# Patient Record
Sex: MALE | URBAN - METROPOLITAN AREA
[De-identification: names, ages, dates, MRNs, and addresses within clinical notes are randomized per-mention and may not be internally consistent; named-entity substitution may affect disease eponyms.]

---

## 2022-09-22 ENCOUNTER — TRANSFERRED RECORDS (OUTPATIENT)
Dept: HEALTH INFORMATION MANAGEMENT | Facility: CLINIC | Age: 77
End: 2022-09-22

## 2022-12-14 ENCOUNTER — TRANSFERRED RECORDS (OUTPATIENT)
Dept: HEALTH INFORMATION MANAGEMENT | Facility: CLINIC | Age: 77
End: 2022-12-14

## 2022-12-24 ENCOUNTER — TRANSFERRED RECORDS (OUTPATIENT)
Dept: HEALTH INFORMATION MANAGEMENT | Facility: CLINIC | Age: 77
End: 2022-12-24

## 2022-12-24 ENCOUNTER — TRANSFERRED RECORDS (OUTPATIENT)
Dept: MULTI SPECIALTY CLINIC | Facility: CLINIC | Age: 77
End: 2022-12-24
Payer: MEDICARE

## 2022-12-24 LAB
ALT SERPL-CCNC: 102 U/L (ref 16–63)
AST SERPL-CCNC: 71 U/L (ref 15–37)
CREATININE (EXTERNAL): 1.3 MG/DL (ref 0.7–1.3)
GFR ESTIMATED (EXTERNAL): 54 ML/MIN/1.73M2
GLUCOSE (EXTERNAL): 114 MG/DL (ref 70–100)
POTASSIUM (EXTERNAL): 3.3 MMOL/L (ref 3.5–5.1)

## 2022-12-25 ENCOUNTER — APPOINTMENT (OUTPATIENT)
Dept: GENERAL RADIOLOGY | Facility: CLINIC | Age: 77
DRG: 439 | End: 2022-12-25
Attending: STUDENT IN AN ORGANIZED HEALTH CARE EDUCATION/TRAINING PROGRAM
Payer: MEDICARE

## 2022-12-25 ENCOUNTER — APPOINTMENT (OUTPATIENT)
Dept: ULTRASOUND IMAGING | Facility: CLINIC | Age: 77
DRG: 439 | End: 2022-12-25
Attending: INTERNAL MEDICINE
Payer: MEDICARE

## 2022-12-25 ENCOUNTER — HOSPITAL ENCOUNTER (INPATIENT)
Facility: CLINIC | Age: 77
LOS: 3 days | Discharge: HOME | DRG: 439 | End: 2022-12-28
Attending: INTERNAL MEDICINE | Admitting: INTERNAL MEDICINE
Payer: MEDICARE

## 2022-12-25 PROBLEM — K85.90 PANCREATITIS: Status: ACTIVE | Noted: 2022-12-25

## 2022-12-25 LAB
ALBUMIN SERPL BCG-MCNC: 3.4 G/DL (ref 3.5–5.2)
ALP SERPL-CCNC: 117 U/L (ref 40–129)
ALT SERPL W P-5'-P-CCNC: 65 U/L (ref 10–50)
ANION GAP SERPL CALCULATED.3IONS-SCNC: 11 MMOL/L (ref 7–15)
AST SERPL W P-5'-P-CCNC: 49 U/L (ref 10–50)
BASOPHILS # BLD AUTO: 0 10E3/UL (ref 0–0.2)
BASOPHILS NFR BLD AUTO: 0 %
BILIRUB DIRECT SERPL-MCNC: 0.34 MG/DL (ref 0–0.3)
BILIRUB SERPL-MCNC: 1.3 MG/DL
BUN SERPL-MCNC: 23.9 MG/DL (ref 8–23)
CALCIUM SERPL-MCNC: 9.3 MG/DL (ref 8.8–10.2)
CHLORIDE SERPL-SCNC: 108 MMOL/L (ref 98–107)
CREAT SERPL-MCNC: 1.21 MG/DL (ref 0.67–1.17)
CRP SERPL-MCNC: 115 MG/L
DEPRECATED HCO3 PLAS-SCNC: 23 MMOL/L (ref 22–29)
EOSINOPHIL # BLD AUTO: 0.1 10E3/UL (ref 0–0.7)
EOSINOPHIL NFR BLD AUTO: 0 %
ERYTHROCYTE [DISTWIDTH] IN BLOOD BY AUTOMATED COUNT: 13.1 % (ref 10–15)
GFR SERPL CREATININE-BSD FRML MDRD: 62 ML/MIN/1.73M2
GLUCOSE BLDC GLUCOMTR-MCNC: 103 MG/DL (ref 70–99)
GLUCOSE SERPL-MCNC: 93 MG/DL (ref 70–99)
HCT VFR BLD AUTO: 40.4 % (ref 40–53)
HGB BLD-MCNC: 13.6 G/DL (ref 13.3–17.7)
IMM GRANULOCYTES # BLD: 0.1 10E3/UL
IMM GRANULOCYTES NFR BLD: 1 %
INR PPP: 1.39 (ref 0.85–1.15)
LYMPHOCYTES # BLD AUTO: 1.5 10E3/UL (ref 0.8–5.3)
LYMPHOCYTES NFR BLD AUTO: 11 %
MAGNESIUM SERPL-MCNC: 1.8 MG/DL (ref 1.7–2.3)
MCH RBC QN AUTO: 31.3 PG (ref 26.5–33)
MCHC RBC AUTO-ENTMCNC: 33.7 G/DL (ref 31.5–36.5)
MCV RBC AUTO: 93 FL (ref 78–100)
MONOCYTES # BLD AUTO: 0.8 10E3/UL (ref 0–1.3)
MONOCYTES NFR BLD AUTO: 6 %
NEUTROPHILS # BLD AUTO: 11.5 10E3/UL (ref 1.6–8.3)
NEUTROPHILS NFR BLD AUTO: 82 %
NRBC # BLD AUTO: 0 10E3/UL
NRBC BLD AUTO-RTO: 0 /100
PLATELET # BLD AUTO: 171 10E3/UL (ref 150–450)
POTASSIUM SERPL-SCNC: 3.4 MMOL/L (ref 3.4–5.3)
PROT SERPL-MCNC: 5.9 G/DL (ref 6.4–8.3)
RBC # BLD AUTO: 4.35 10E6/UL (ref 4.4–5.9)
SODIUM SERPL-SCNC: 142 MMOL/L (ref 136–145)
TRIGL SERPL-MCNC: 39 MG/DL
WBC # BLD AUTO: 14 10E3/UL (ref 4–11)

## 2022-12-25 PROCEDURE — 82248 BILIRUBIN DIRECT: CPT | Performed by: STUDENT IN AN ORGANIZED HEALTH CARE EDUCATION/TRAINING PROGRAM

## 2022-12-25 PROCEDURE — 76705 ECHO EXAM OF ABDOMEN: CPT | Mod: 26 | Performed by: STUDENT IN AN ORGANIZED HEALTH CARE EDUCATION/TRAINING PROGRAM

## 2022-12-25 PROCEDURE — 93005 ELECTROCARDIOGRAM TRACING: CPT

## 2022-12-25 PROCEDURE — 80053 COMPREHEN METABOLIC PANEL: CPT | Performed by: STUDENT IN AN ORGANIZED HEALTH CARE EDUCATION/TRAINING PROGRAM

## 2022-12-25 PROCEDURE — 71046 X-RAY EXAM CHEST 2 VIEWS: CPT | Mod: 26 | Performed by: STUDENT IN AN ORGANIZED HEALTH CARE EDUCATION/TRAINING PROGRAM

## 2022-12-25 PROCEDURE — 250N000013 HC RX MED GY IP 250 OP 250 PS 637: Performed by: STUDENT IN AN ORGANIZED HEALTH CARE EDUCATION/TRAINING PROGRAM

## 2022-12-25 PROCEDURE — 120N000002 HC R&B MED SURG/OB UMMC

## 2022-12-25 PROCEDURE — 258N000003 HC RX IP 258 OP 636: Performed by: STUDENT IN AN ORGANIZED HEALTH CARE EDUCATION/TRAINING PROGRAM

## 2022-12-25 PROCEDURE — 36415 COLL VENOUS BLD VENIPUNCTURE: CPT | Performed by: STUDENT IN AN ORGANIZED HEALTH CARE EDUCATION/TRAINING PROGRAM

## 2022-12-25 PROCEDURE — 250N000011 HC RX IP 250 OP 636: Performed by: STUDENT IN AN ORGANIZED HEALTH CARE EDUCATION/TRAINING PROGRAM

## 2022-12-25 PROCEDURE — 76705 ECHO EXAM OF ABDOMEN: CPT

## 2022-12-25 PROCEDURE — 87040 BLOOD CULTURE FOR BACTERIA: CPT | Performed by: STUDENT IN AN ORGANIZED HEALTH CARE EDUCATION/TRAINING PROGRAM

## 2022-12-25 PROCEDURE — 93010 ELECTROCARDIOGRAM REPORT: CPT | Performed by: INTERNAL MEDICINE

## 2022-12-25 PROCEDURE — 86140 C-REACTIVE PROTEIN: CPT | Performed by: STUDENT IN AN ORGANIZED HEALTH CARE EDUCATION/TRAINING PROGRAM

## 2022-12-25 PROCEDURE — 71046 X-RAY EXAM CHEST 2 VIEWS: CPT

## 2022-12-25 PROCEDURE — 84478 ASSAY OF TRIGLYCERIDES: CPT | Performed by: STUDENT IN AN ORGANIZED HEALTH CARE EDUCATION/TRAINING PROGRAM

## 2022-12-25 PROCEDURE — 83735 ASSAY OF MAGNESIUM: CPT | Performed by: STUDENT IN AN ORGANIZED HEALTH CARE EDUCATION/TRAINING PROGRAM

## 2022-12-25 PROCEDURE — 85025 COMPLETE CBC W/AUTO DIFF WBC: CPT | Performed by: STUDENT IN AN ORGANIZED HEALTH CARE EDUCATION/TRAINING PROGRAM

## 2022-12-25 PROCEDURE — 999N000285 HC STATISTIC VASC ACCESS LAB DRAW WITH PIV START

## 2022-12-25 PROCEDURE — 85610 PROTHROMBIN TIME: CPT | Performed by: STUDENT IN AN ORGANIZED HEALTH CARE EDUCATION/TRAINING PROGRAM

## 2022-12-25 PROCEDURE — 999N000128 HC STATISTIC PERIPHERAL IV START W/O US GUIDANCE

## 2022-12-25 RX ORDER — MONTELUKAST SODIUM 10 MG/1
10 TABLET ORAL AT BEDTIME
Status: DISCONTINUED | OUTPATIENT
Start: 2022-12-25 | End: 2022-12-28 | Stop reason: HOSPADM

## 2022-12-25 RX ORDER — METRONIDAZOLE 500 MG/100ML
500 INJECTION, SOLUTION INTRAVENOUS EVERY 8 HOURS
Status: DISCONTINUED | OUTPATIENT
Start: 2022-12-25 | End: 2022-12-26

## 2022-12-25 RX ORDER — ACETAMINOPHEN 325 MG/1
650 TABLET ORAL EVERY 6 HOURS PRN
Status: DISCONTINUED | OUTPATIENT
Start: 2022-12-25 | End: 2022-12-28 | Stop reason: HOSPADM

## 2022-12-25 RX ORDER — MONTELUKAST SODIUM 10 MG/1
10 TABLET ORAL AT BEDTIME
COMMUNITY
Start: 2022-08-25

## 2022-12-25 RX ORDER — LIDOCAINE 40 MG/G
CREAM TOPICAL
Status: DISCONTINUED | OUTPATIENT
Start: 2022-12-25 | End: 2022-12-28 | Stop reason: HOSPADM

## 2022-12-25 RX ORDER — HYDROCHLOROTHIAZIDE 12.5 MG/1
12.5 TABLET ORAL DAILY
Status: DISCONTINUED | OUTPATIENT
Start: 2022-12-25 | End: 2022-12-28 | Stop reason: HOSPADM

## 2022-12-25 RX ORDER — NALOXONE HYDROCHLORIDE 0.4 MG/ML
0.4 INJECTION, SOLUTION INTRAMUSCULAR; INTRAVENOUS; SUBCUTANEOUS
Status: DISCONTINUED | OUTPATIENT
Start: 2022-12-25 | End: 2022-12-28 | Stop reason: HOSPADM

## 2022-12-25 RX ORDER — HYDROCHLOROTHIAZIDE 12.5 MG/1
12.5 TABLET ORAL DAILY
COMMUNITY
Start: 2022-08-25

## 2022-12-25 RX ORDER — OXYCODONE HYDROCHLORIDE 5 MG/1
5 TABLET ORAL EVERY 4 HOURS PRN
Status: DISCONTINUED | OUTPATIENT
Start: 2022-12-25 | End: 2022-12-28 | Stop reason: HOSPADM

## 2022-12-25 RX ORDER — ONDANSETRON 4 MG/1
4 TABLET, ORALLY DISINTEGRATING ORAL EVERY 6 HOURS PRN
Status: DISCONTINUED | OUTPATIENT
Start: 2022-12-25 | End: 2022-12-28 | Stop reason: HOSPADM

## 2022-12-25 RX ORDER — SENNOSIDES 8.6 MG
8.6 TABLET ORAL DAILY
Status: DISCONTINUED | OUTPATIENT
Start: 2022-12-25 | End: 2022-12-28 | Stop reason: HOSPADM

## 2022-12-25 RX ORDER — AMLODIPINE BESYLATE 10 MG/1
10 TABLET ORAL DAILY
Status: DISCONTINUED | OUTPATIENT
Start: 2022-12-25 | End: 2022-12-28 | Stop reason: HOSPADM

## 2022-12-25 RX ORDER — LISINOPRIL 10 MG/1
10 TABLET ORAL DAILY
Status: DISCONTINUED | OUTPATIENT
Start: 2022-12-25 | End: 2022-12-28 | Stop reason: HOSPADM

## 2022-12-25 RX ORDER — NALOXONE HYDROCHLORIDE 0.4 MG/ML
0.2 INJECTION, SOLUTION INTRAMUSCULAR; INTRAVENOUS; SUBCUTANEOUS
Status: DISCONTINUED | OUTPATIENT
Start: 2022-12-25 | End: 2022-12-28 | Stop reason: HOSPADM

## 2022-12-25 RX ORDER — HYDROMORPHONE HYDROCHLORIDE 1 MG/ML
0.5 INJECTION, SOLUTION INTRAMUSCULAR; INTRAVENOUS; SUBCUTANEOUS
Status: DISCONTINUED | OUTPATIENT
Start: 2022-12-25 | End: 2022-12-27

## 2022-12-25 RX ORDER — BENAZEPRIL HYDROCHLORIDE 10 MG/1
10 TABLET ORAL DAILY
COMMUNITY
Start: 2022-08-25

## 2022-12-25 RX ORDER — ATORVASTATIN CALCIUM 40 MG/1
40 TABLET, FILM COATED ORAL EVERY EVENING
Status: DISCONTINUED | OUTPATIENT
Start: 2022-12-25 | End: 2022-12-28 | Stop reason: HOSPADM

## 2022-12-25 RX ORDER — AMLODIPINE BESYLATE 10 MG/1
10 TABLET ORAL DAILY
COMMUNITY
Start: 2022-08-25

## 2022-12-25 RX ORDER — SODIUM CHLORIDE, SODIUM LACTATE, POTASSIUM CHLORIDE, CALCIUM CHLORIDE 600; 310; 30; 20 MG/100ML; MG/100ML; MG/100ML; MG/100ML
INJECTION, SOLUTION INTRAVENOUS CONTINUOUS
Status: DISCONTINUED | OUTPATIENT
Start: 2022-12-25 | End: 2022-12-25

## 2022-12-25 RX ORDER — CEFTRIAXONE 2 G/1
2 INJECTION, POWDER, FOR SOLUTION INTRAMUSCULAR; INTRAVENOUS EVERY 24 HOURS
Status: DISCONTINUED | OUTPATIENT
Start: 2022-12-25 | End: 2022-12-26

## 2022-12-25 RX ORDER — POLYETHYLENE GLYCOL 3350 17 G/17G
17 POWDER, FOR SOLUTION ORAL DAILY PRN
Status: DISCONTINUED | OUTPATIENT
Start: 2022-12-25 | End: 2022-12-28 | Stop reason: HOSPADM

## 2022-12-25 RX ORDER — PIPERACILLIN SODIUM, TAZOBACTAM SODIUM 3; .375 G/15ML; G/15ML
3.38 INJECTION, POWDER, LYOPHILIZED, FOR SOLUTION INTRAVENOUS EVERY 6 HOURS
Status: DISCONTINUED | OUTPATIENT
Start: 2022-12-25 | End: 2022-12-25

## 2022-12-25 RX ORDER — LANOLIN ALCOHOL/MO/W.PET/CERES
2000 CREAM (GRAM) TOPICAL DAILY
Status: DISCONTINUED | OUTPATIENT
Start: 2022-12-25 | End: 2022-12-28 | Stop reason: HOSPADM

## 2022-12-25 RX ORDER — SENNOSIDES 8.6 MG
8.6 TABLET ORAL 2 TIMES DAILY PRN
Status: DISCONTINUED | OUTPATIENT
Start: 2022-12-25 | End: 2022-12-25

## 2022-12-25 RX ORDER — OLOPATADINE HYDROCHLORIDE 665 UG/1
2 SPRAY NASAL 3 TIMES DAILY PRN
COMMUNITY
Start: 2022-10-31 | End: 2023-10-31

## 2022-12-25 RX ORDER — ATORVASTATIN CALCIUM 40 MG/1
40 TABLET, FILM COATED ORAL AT BEDTIME
COMMUNITY
Start: 2022-09-23 | End: 2023-09-18

## 2022-12-25 RX ORDER — SODIUM CHLORIDE 9 MG/ML
INJECTION, SOLUTION INTRAVENOUS CONTINUOUS
Status: DISCONTINUED | OUTPATIENT
Start: 2022-12-25 | End: 2022-12-25

## 2022-12-25 RX ORDER — POLYETHYLENE GLYCOL 3350 17 G/17G
17 POWDER, FOR SOLUTION ORAL DAILY
Status: DISCONTINUED | OUTPATIENT
Start: 2022-12-25 | End: 2022-12-25

## 2022-12-25 RX ORDER — POTASSIUM CHLORIDE 750 MG/1
40 TABLET, EXTENDED RELEASE ORAL ONCE
Status: COMPLETED | OUTPATIENT
Start: 2022-12-25 | End: 2022-12-25

## 2022-12-25 RX ORDER — SODIUM CHLORIDE, SODIUM LACTATE, POTASSIUM CHLORIDE, CALCIUM CHLORIDE 600; 310; 30; 20 MG/100ML; MG/100ML; MG/100ML; MG/100ML
INJECTION, SOLUTION INTRAVENOUS CONTINUOUS
Status: DISCONTINUED | OUTPATIENT
Start: 2022-12-25 | End: 2022-12-27

## 2022-12-25 RX ADMIN — ACETAMINOPHEN 650 MG: 325 TABLET, FILM COATED ORAL at 09:19

## 2022-12-25 RX ADMIN — CEFTRIAXONE SODIUM 2 G: 2 INJECTION, POWDER, FOR SOLUTION INTRAMUSCULAR; INTRAVENOUS at 12:27

## 2022-12-25 RX ADMIN — SODIUM CHLORIDE, POTASSIUM CHLORIDE, SODIUM LACTATE AND CALCIUM CHLORIDE: 600; 310; 30; 20 INJECTION, SOLUTION INTRAVENOUS at 21:55

## 2022-12-25 RX ADMIN — POTASSIUM CHLORIDE 40 MEQ: 750 TABLET, EXTENDED RELEASE ORAL at 09:43

## 2022-12-25 RX ADMIN — SODIUM CHLORIDE, POTASSIUM CHLORIDE, SODIUM LACTATE AND CALCIUM CHLORIDE: 600; 310; 30; 20 INJECTION, SOLUTION INTRAVENOUS at 09:45

## 2022-12-25 RX ADMIN — HYDROCHLOROTHIAZIDE 12.5 MG: 12.5 TABLET ORAL at 07:52

## 2022-12-25 RX ADMIN — SODIUM CHLORIDE, POTASSIUM CHLORIDE, SODIUM LACTATE AND CALCIUM CHLORIDE 1000 ML: 600; 310; 30; 20 INJECTION, SOLUTION INTRAVENOUS at 10:45

## 2022-12-25 RX ADMIN — SODIUM CHLORIDE: 9 INJECTION, SOLUTION INTRAVENOUS at 05:18

## 2022-12-25 RX ADMIN — PIPERACILLIN AND TAZOBACTAM 3.38 G: 3; .375 INJECTION, POWDER, LYOPHILIZED, FOR SOLUTION INTRAVENOUS at 05:18

## 2022-12-25 RX ADMIN — SODIUM CHLORIDE, POTASSIUM CHLORIDE, SODIUM LACTATE AND CALCIUM CHLORIDE: 600; 310; 30; 20 INJECTION, SOLUTION INTRAVENOUS at 14:18

## 2022-12-25 RX ADMIN — METRONIDAZOLE 500 MG: 500 INJECTION, SOLUTION INTRAVENOUS at 10:51

## 2022-12-25 RX ADMIN — MONTELUKAST 10 MG: 10 TABLET, FILM COATED ORAL at 21:44

## 2022-12-25 RX ADMIN — OXYCODONE HYDROCHLORIDE 5 MG: 5 TABLET ORAL at 09:19

## 2022-12-25 RX ADMIN — Medication 2000 MCG: at 07:53

## 2022-12-25 RX ADMIN — LISINOPRIL 10 MG: 10 TABLET ORAL at 07:53

## 2022-12-25 RX ADMIN — AMLODIPINE BESYLATE 10 MG: 10 TABLET ORAL at 07:52

## 2022-12-25 RX ADMIN — ATORVASTATIN CALCIUM 40 MG: 40 TABLET, FILM COATED ORAL at 21:44

## 2022-12-25 RX ADMIN — ACETAMINOPHEN 650 MG: 325 TABLET, FILM COATED ORAL at 21:44

## 2022-12-25 RX ADMIN — PIPERACILLIN AND TAZOBACTAM 3.38 G: 3; .375 INJECTION, POWDER, LYOPHILIZED, FOR SOLUTION INTRAVENOUS at 09:43

## 2022-12-25 RX ADMIN — METRONIDAZOLE 500 MG: 500 INJECTION, SOLUTION INTRAVENOUS at 19:30

## 2022-12-25 RX ADMIN — SENNOSIDES 8.6 MG: 8.6 TABLET, FILM COATED ORAL at 09:43

## 2022-12-25 ASSESSMENT — ACTIVITIES OF DAILY LIVING (ADL)
ADLS_ACUITY_SCORE: 20
ADLS_ACUITY_SCORE: 20
ADLS_ACUITY_SCORE: 33
ADLS_ACUITY_SCORE: 20

## 2022-12-25 NOTE — PLAN OF CARE
[id number]: Hypertensive. Parameters to notify not met. OVSS on RA. Denies nausea and SOB. PRN Oxy and tylenol given for 4/10 abdominal pain and effective. Pt denies pain this afternoon. Abd US and 2 view XR completed this shift. Had a BM this shift. 1L LR bolus given over 4 hours. Continues with IVMF @125 mL/Hr. K 3.4 and replaced orally. Recheck with AM labs. Zosyn discontinued. Started Rocephin and Flagyl. Diet advanced to clear liquid. Had some chicken broth and 1 cup of lemon ice for lunch. Had a small amount of emesis afterwards. Wife at bedside.

## 2022-12-25 NOTE — CONSULTS
"    GASTROENTEROLOGY CONSULTATION      Date of Admission:  12/25/2022          Chief Complaint:   We were asked by Heri Talbot MD of  to evaluate this patient with \"Pancreatitis\"          ASSESSMENT AND RECOMMENDATIONS:   Assessment:  Tay Montanez is a 77 year old male with a history of HTN, HLD, Asthma who was admitted 12/24/22 for acute pancreatitis.    # Acute interstitial pancreatitis     Criteria for diagnosis: abdominal pain suggestive of AP, lipase > 3 ULN, CT abdomen    Etiology:  Likely gallstones given dynamic changes of LFTs. TG, Calcium are normal.     Index CT abdomen: 12/24/22     BISAP on admission: 3    Organ Failure: JOCELYN    Fluid collection: None     Intervention: None    Thrombosis: None    Nutrition: NPO     Recommendations  - Okay for clear liquid diet and then advance diet slowly as tolerated to low fat and low residue diet.    - IV fluids: Recommend LR at at 5-10cc/kg/hr or 250 cc/hr with a goal of getting in 6 L of fluid over the first 24 hours. This will reduce the chances of developing pancreatic necrosis or other complications of pancreatitis. Aim for HR <120, UOP: 0.5-1 ml/kg/hr, BUN < 25 (or - Hematocrit reduction by at least 35 %, BUN decrease by about 35%)  - Pain control with opioids as needed per primary team. Avoid constipation with scheduled bowel regimen.   - Closely monitor electrolytes and replete PRN.   - Check triglyceride level.   - Check a RUQ US to rule out cholelithiasis.  - Consult surgery for cholecystectomy as inpatient.   - Encourage PO intake in the next 24 - 48 hours, nutritional support should be provided if remains NPO > 7 days, Nasojejunal feeds are preferred over TPN.  - Pain control, anti-emetics per primary team.     Gastroenterology follow up recommendations: Pending clinical course       Patient care plan discussed with Merrill Mederos MD, GI staff physician. Thank you for involving us in this patient's care. Please do not hesitate to contact " the GI service with any questions or concerns.     Ricky Perez M.D  GI fellow  [id number]  Department of Gastroenterology   -------------------------------------------------------------------------------------------------------------------   History is obtained from the patient and the medical record.          History of Present Illness:   Tay Montanez is a 77 year old male with a history of HTN, HLD, Asthma who was admitted 12/24/22 for acute pancreatitis as a transfer from [facility].   Per chart review, the patient presented to the ED in of upper abdominal discomfort several hours prior to his arrival to the emergency department. This was accompanied by at least 6 episodes of emesis. He did attempt to take some ibuprofen but vomited shortly after taking it. The patient's wife does relate a subjective fever. He did have concerns for some chills.The patient was unable to control his symptoms and subsequently summoned EMS for transfer to the emergency department. The patient's white count is found to be elevated at 15.1. His hemoglobin is found to be 15.4 with a platelet count of 260,000. His metabolic panel demonstrates a mildly elevated glucose at 187 with a BUN of 32 and a creatinine of 1.6 which is elevated from his baseline of 1.1.  The patient's alkaline phosphatase is elevated at 150 with an elevated AST at 193 and ALT at 137. His lipase is dramatically elevated at 9980. His lactate is elevated at 2.7. His troponin is within normal limits at 0.01. He is negative for influenza and COVID. His urinalysis demonstrates no evidence to suggest an infection.  CT scan of his abdomen and pelvis with IV contrast showed concerns for acute interstitial edematous pancreatitis. He does have scattered calcifications about his pancreas concerning for some underlying chronic pancreatitis as well. The patient received a bolus of LR and was transferred to the [facility] for further management. Prior to transfer he had a fever of  101 and BC were collected. CT head was unremarkable and Zosyn was initiated. 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   The patient denies alcohol use and is a never smoker. No prior pancreatitis. No FH of pancreatitis.             Past Medical History:   Reviewed and edited as appropriate  No past medical history on file.         Past Surgical History:   Reviewed and edited as appropriate   No past surgical history on file.         Previous Endoscopy:   No results found for this or any previous visit.         Social History:   Reviewed and edited as appropriate  Social History     Socioeconomic History     Marital status: Not on file     Spouse name: Not on file     Number of children: Not on file     Years of education: Not on file     Highest education level: Not on file   Occupational History     Not on file   Tobacco Use     Smoking status: Not on file     Smokeless tobacco: Not on file   Substance and Sexual Activity     Alcohol use: Not on file     Drug use: Not on file     Sexual activity: Not on file   Other Topics Concern     Not on file   Social History Narrative     Not on file     Social Determinants of Health     Financial Resource Strain: Not on file   Food Insecurity: Not on file   Transportation Needs: Not on file   Physical Activity: Not on file   Stress: Not on file   Social Connections: Not on file   Intimate Partner Violence: Not on file   Housing Stability: Not on file            Family History:   Reviewed and edited as appropriate  No family history on file.        Allergies:   Reviewed and edited as appropriate   Not on File         Medications:     Medications Prior to Admission   Medication Sig Dispense Refill Last Dose     amLODIPine (NORVASC) 10 MG tablet Take 1 tablet by mouth daily        aspirin (ASA) 81 MG EC tablet Take 1 tablet by mouth daily         "atorvastatin (LIPITOR) 40 MG tablet Take 40 mg by mouth        benazepril (LOTENSIN) 10 MG tablet Take 1 tablet by mouth daily        hydrochlorothiazide (HYDRODIURIL) 12.5 MG tablet Take 1 tablet by mouth daily        montelukast (SINGULAIR) 10 MG tablet Take 1 tablet by mouth At Bedtime        olopatadine (PATANASE) 0.6 % nasal spray Spray 2 sprays in nostril        Cyanocobalamin 2000 MCG TBCR Take 1 tablet by mouth daily as needed        diphenhydrAMINE HCl, Sleep, 25 MG CAPS Take 1 tablet by mouth nightly as needed        Multiple Vitamin (MULTI VITAMIN MENS PO) Take 1 tablet by mouth daily as needed                Review of Systems:     A complete review of systems was performed and is negative except as noted in the HPI           Physical Exam:   BP (!) 146/67 (BP Location: Left arm)   Pulse 86   Temp 99.6  F (37.6  C) (Oral)   Resp 20   Ht 1.727 m (5' 8\")   SpO2 94%   Wt:   Wt Readings from Last 2 Encounters:   No data found for Wt      Constitutional: cooperative, pleasant, not dyspneic/diaphoretic, no acute distress  Eyes: Sclera anicteric/injected  Ears/nose/mouth/throat: Normal oropharynx without ulcers or exudate, mucus membranes moist, hearing intact  Neck: supple, thyroid normal size  CV: No edema  Respiratory: Unlabored breathing  Lymph: No axillary, submandibular, supraclavicular or inguinal lymphadenopathy  Abd: mild epigastric tenderness, nondistended, +bs, no hepatosplenomegaly, nontender, no peritoneal signs  Skin: warm, perfused, no jaundice  Neuro: AAO x 3, No asterixis  Psych: Normal affect  MSK: Normal gait         Data:   Labs and imaging below were independently reviewed and interpreted    BMP  Recent Labs   Lab 12/25/22  0659      POTASSIUM 3.4   CHLORIDE 108*   LUIS FERNANDO 9.3   CO2 23   BUN 23.9*   CR 1.21*   GLC 93     CBC  Recent Labs   Lab 12/25/22  0659   WBC 14.0*   RBC 4.35*   HGB 13.6   HCT 40.4   MCV 93   MCH 31.3   MCHC 33.7   RDW 13.1        INR  Recent Labs   Lab " 12/25/22  0659   INR 1.39*     LFTs  Recent Labs   Lab 12/25/22  0659   ALKPHOS 117   AST 49   ALT 65*   BILITOTAL 1.3*   PROTTOTAL 5.9*   ALBUMIN 3.4*      PANCNo lab results found in last 7 days.    Imaging:  CT Abdomen Pelvis with Contrast (Final result) Result time 12/24/22 08:40:53   Final result by Naresh Monroe MD (12/24/22 08:40:53)           Narrative:   For Patients: As a result of the 21st Century Cures Act, medical imaging   exams and procedure reports are released immediately into your electronic   medical record. You may view this report before your referring provider.   If you have questions, please contact your health care provider.    INDICATION: Acute abdominal pain, vomiting, elevated WBC, guarding on   palpation    Indication:    Acute abdominal pain. Vomiting. Leukocytosis. Guarding on physical exam.     Technique:    CT of the abdomen and pelvis. Coronal/sagittal reconstruction images. 75 cc   of Omnipaque 350 IV.     Comparison:    None.     Findings:    Lung bases:     Coronary artery calcifications. Calcifications of the aortic root. The   ascending thoracic aorta measures 4.6 cm in dimension. This is dilated.   There is bibasilar dependent atelectasis. There is no basilar pneumothorax.   There is no suspicious pulmonary nodule. 2 millimeter lingular nodule on   image 12, series 201, of doubtful significance.     Abdomen/pelvis:     The liver morphology is non cirrhotic. There is no perihepatic ascites. No   calcified gallstone is seen. Inflammatory changes are present surrounding   the pancreas. Pancreatic parenchymal calcifications are noted. Findings   suggest acute interstitial edematous pancreatitis. No organized   peripancreatic fluid collection is seen. Extensive renal sinus cysts. Left   renal cortical cyst is also noted. No splenomegaly. The splenic vein, SMV,   and extrahepatic main portal vein are patent. No solid renal mass or   striated nephrogram. Urinary bladder is normal.  There is no free air. The   prostate measures 4.2 x 5.3 cm in AP and transverse dimensions. There is no   evidence for a small bowel or colonic obstruction. There is no transition   point. No mucosal hyper enhancement involving the small bowel or colon. No   pneumoperitoneum. There is no inguinal or pelvic sidewall lymphadenopathy.   Degenerative calcific plaque in a normal caliber abdominal aorta.     The bone windows demonstrate no suspicious bone lesions. There is   degenerative disc disease present in the lower thoracic and lumbar spine.   Vertebral body heights are maintained on sagittal reconstruction images.     Impression:    1. Acute interstitial edematous pancreatitis.     2. No organized peripancreatic fluid collection is seen.     3. Right upper quadrant ultrasound is suggested. No common bile duct stone   is visualized by single phase CT.     4. There is no free air, bowel obstruction, or drainable fluid collection.     5. Bilateral renal sinus cysts.

## 2022-12-25 NOTE — PLAN OF CARE
"Goal Outcome Evaluation:    BP (!) 146/67 (BP Location: Left arm)   Pulse 86   Temp 99.6  F (37.6  C) (Oral)   Resp 20   Ht 1.727 m (5' 8\")   SpO2 94%     Pt has been vitally stable during shift. Admission duties complete. A&Ox4, on RA, denied N/V. Complained of slight abdominal pain, but stated it was more of a hunger pain. Has been NPO with some ice chips for dry mouth. Will have abd ultrasound and GI consult today. Has not voided since getting here. Came in with PIV and would like to keep it. Continue with plan of care.      "

## 2022-12-25 NOTE — H&P
[facility]    History and Physical - Medicine Service, MARDINA TEAM 1       Date of Admission:  12/25/2022    Assessment & Plan      Tay Montanez is a 77 year old male admitted on 12/25/2022. He has a history of epilepsy s/p brain procedure and is admitted for     #Pancreatitis  New onset of abdominal pain and emesis. No alcohol use nor changes in medication. No history of colicky pain with fatty foods. Without alcohol use, greatest concern is for gall stone pancreatitis. Unable to get RUQ US at OSH. CT A/P with no evidence of cirrhosis, nor ascites. Inflammatory changes within the pancreas seen.  - NPO  - GI consulted  - Pain: APAP Q6 sheduled, oxycodone PRN, hydromorphone PRN (3rd line)  - Abx with piperacillin/tazobactam  - CBC, CMP, INR, CRP in AM  - RUQ US    #Aortic Stenosis  #Mitral Regurgitation  Echo on 10/22 with severe mitral regurgitation, consistent with anterior leaflet pathology - Left cath without evidence of ischemia or severe arterial stenosis. Aortic valve with moderate stenosis  - Plan for surgery in 01/2023    #HTN  - Continue home hydrochlorothiazide  - Continue home benazipril  - Continue home amlodipine    #Asthma  - Continue home montelukast    #Epilepsy, Resolved  S/p brain surgery nearly 30 years prior, no longer on anti-epileptics.     Diet: NPO per Anesthesia Guidelines for Procedure/Surgery Except for: Meds    DVT Prophylaxis: Pneumatic Compression Devices  Lamb Catheter: Not present  Fluids: /Hr for 8 Hrs  Central Lines: None  Cardiac Monitoring: None  Code Status: Full Code    Disposition Plan      Expected Discharge Date: 12/27/2022                To be staffed in AM.    Heri Talobt MD  Medicine Service, [facility] TEAM 1  [facility]  Securely message with the Vocera Web Console (learn more here)  Text page via [employer] Paging/Directory   Please see signed in provider for up to  date coverage information    ______________________________________________________________________    Chief Complaint   Pancreatitis.    History is obtained from the patient    History of Present Illness   Tay Montanez is a 77 year old male who has a history of HTN, HLD, Asthma and is admitted as a transfer from OSH for pancreatitis.    From OSH -  1 day hx of acute abdominal pain, found to have acute pancreatitis on CTAP and lipase >9000. He was started on supportive management with pain control and fluid resuscitation, however developed tachycardia and fever to 101 F today. Of note patient had denied any hx of alcohol use and work up negative for hyper triglyceridemia.      Given fevers and no other source of infection, advised blood cultures, started on empiric antibiotics with piperacillin/tazobactam. Also reportedly there was an episode of acute change in mental status. As per ER physician there were no neurologic deficit and mentation returned to normal with in 10 minutes. I advised to get brain imaging's to further evaluate given remote hx of brain surgery. CT head with no acute pathology.     Patient transferred to [facility] for RUQ US and GI consult.     On arrival, patient with minimal discomfort. Wife at bedside in chair. No longer having fevers, chills, sweats. No chest pain, dyspnea.    Review of Systems    The 10 point Review of Systems is negative other than noted in the HPI or here.     Past Medical History    I have reviewed this patient's medical history and updated it with pertinent information if needed.   No past medical history on file.     Past Surgical History   I have reviewed this patient's surgical history and updated it with pertinent information if needed.  No past surgical history on file.     Social History   I have reviewed this patient's social history and updated it with pertinent information if needed. Tay Montanez      Family History   No significant family history, including no  history of: colon and pancreatic disease    Prior to Admission Medications   Prior to Admission Medications   Prescriptions Last Dose Informant Patient Reported? Taking?   Cyanocobalamin 2000 MCG TBCR   Yes No   Sig: Take 1 tablet by mouth daily as needed   Multiple Vitamin (MULTI VITAMIN MENS PO)   Yes No   Sig: Take 1 tablet by mouth daily as needed   amLODIPine (NORVASC) 10 MG tablet   Yes Yes   Sig: Take 1 tablet by mouth daily   aspirin (ASA) 81 MG EC tablet   Yes Yes   Sig: Take 1 tablet by mouth daily   atorvastatin (LIPITOR) 40 MG tablet   Yes Yes   Sig: Take 40 mg by mouth   benazepril (LOTENSIN) 10 MG tablet   Yes Yes   Sig: Take 1 tablet by mouth daily   diphenhydrAMINE HCl, Sleep, 25 MG CAPS   Yes No   Sig: Take 1 tablet by mouth nightly as needed   hydrochlorothiazide (HYDRODIURIL) 12.5 MG tablet   Yes Yes   Sig: Take 1 tablet by mouth daily   montelukast (SINGULAIR) 10 MG tablet   Yes Yes   Sig: Take 1 tablet by mouth At Bedtime   olopatadine (PATANASE) 0.6 % nasal spray   Yes Yes   Sig: Spray 2 sprays in nostril      Facility-Administered Medications: None     Allergies   Not on File    Physical Exam   Vital Signs: Temp: 97.6  F (36.4  C) Temp src: Oral BP: (!) 153/71 Pulse: 87   Resp: 20 SpO2: 94 %      Weight: 0 lbs 0 oz    Constitutional: awake, alert, cooperative, no apparent distress, and appears stated age  Eyes: Lids and lashes normal, pupils equal, round and reactive to light, extra ocular muscles intact, sclera clear, conjunctiva normal  ENT: Normocephalic, without obvious abnormality, atraumatic, sinuses nontender on palpation, external ears without lesions, oral pharynx with moist mucous membranes, tonsils without erythema or exudates, gums normal and good dentition.  Hematologic / Lymphatic: no cervical lymphadenopathy and no supraclavicular lymphadenopathy  Respiratory: No increased work of breathing, good air exchange, clear to auscultation bilaterally, no crackles or  wheezing  Cardiovascular: Normal apical impulse, regular rate and rhythm, normal S1 and S2, no S3 or S4, and no murmur noted  GI: Epigastric TTP, no guarding.  Skin: no bruising or bleeding and normal skin color, texture, turgor  Neurologic: Awake, alert, oriented to name, place and time.  Cranial nerves II-XII are grossly intact.     Data   Data reviewed today: I reviewed all medications, new labs and imaging results over the last 24 hours. I personally reviewed no images or EKG's today.    No lab results found in last 7 days.    No results found for this or any previous visit (from the past 24 hour(s)).     CT A/P 12/24  1. Acute interstitial edematous pancreatitis.   2. No organized peripancreatic fluid collection is seen.   3. Right upper quadrant ultrasound is suggested. No common bile duct stone   is visualized by single phase CT.   4. There is no free air, bowel obstruction, or drainable fluid collection.   5. Bilateral renal sinus cysts.

## 2022-12-25 NOTE — PROGRESS NOTES
Nursing Focus: Admission    D: Patient admitted/transferred from [place] via [facility] on [facility] for Pancreatitis evaluation.      I: Upon arrival to the unit patient was oriented to room, unit, and call light. Patient s height, weight, and vital signs were obtained. Allergies reviewed and allergy band applied. MD notified of patient s arrival on the unit. Adult AVS completed. Head to toe assessment completed. Education assessment completed. Care plan initiated.     A: Vital signs stable upon admission. Patient rates pain at 3. Two RN skin assessment completed Yes. Second RN was Chana Chowdary. Significant Skin Findings include N/A. [facility] Nurse Consult Ordered No. Bed Algorithm can be found in PCS flow sheets (Support Surface Algorithm) and on IP [facility] NURSE RESOURCE TAB, was this used during this assessment? Yes. Was a pulsate mattress ordered, No.     P: Continue to monitor patient and intervene as needed. Continue with plan of care. Notify MD with any concerns or changes in patient status.

## 2022-12-25 NOTE — PLAN OF CARE
[facility]  Transfer Triage Note    Date of call: 12/24/22  Time of call: 9:44 PM    Current Patient Location: [place]  Current Level of Care: Med Surg    Vitals: BP: 143/82 HR: 99 O2 Sats: 92% on RA  Diagnosis: acute pancreatitis    Is COVID-19 a concern? Yes  Reason for requested transfer: Procedure can be done here and not at referring hospital   Isolation Needs: None    Outside Records:Not  Available  Additional records may be faxed to [phone].    Transfer accepted: Yes  Stability of Patient: Patient is at risk for instability, however patient requires urgent transfer and does not meet ICU criteria   Level of Care Needed: Med Surg  Telemetry Needed:  Med (Remote) Telemetry  Expected Time of Arrival for Transfer: 0-8 hours  Arrival Location:  [facility]    Recommendations for Management and Stabilization: Given    Additional Comments: 77 year old male with hx of aortic stenosis 1 day hx of acute abdominal pain, found to have acute pancreatitis on CTAP and lipase >9000. He was started on supportive management with pain control and fluid resuscitation, however developed tachycardia and fever to 101 F today. Transferring facility does not have capacity to do the US or MRCP and requested transfer to higher level of care. Of note patient had denied any hx of alcohol use and work up negative for hyper triglyceridemia.     Given fevers and no other source of infection, advised blood cultures, starting on empiric antibiotics. Also reportedly there was an episode of acute change in mental status. As per ER physician there were no neurologic deficit and mentation returned to normal with in 10 minutes. I advised to get brain imaging's to further evaluate given remote hx of brain surgery.     Patient is accepted for transfer, would get RUQ US for any biliary pathology, if remains febrile, would get repeat CTAP to evaluate for possible necrotizing  pancreatitis with consultation to GI.         SUKI SOSA MD

## 2022-12-26 ENCOUNTER — APPOINTMENT (OUTPATIENT)
Dept: CARDIOLOGY | Facility: CLINIC | Age: 77
DRG: 439 | End: 2022-12-26
Attending: INTERNAL MEDICINE
Payer: MEDICARE

## 2022-12-26 ENCOUNTER — APPOINTMENT (OUTPATIENT)
Dept: GENERAL RADIOLOGY | Facility: CLINIC | Age: 77
DRG: 439 | End: 2022-12-26
Attending: INTERNAL MEDICINE
Payer: MEDICARE

## 2022-12-26 LAB
ALBUMIN SERPL BCG-MCNC: 3.2 G/DL (ref 3.5–5.2)
ALP SERPL-CCNC: 101 U/L (ref 40–129)
ALT SERPL W P-5'-P-CCNC: 42 U/L (ref 10–50)
ANION GAP SERPL CALCULATED.3IONS-SCNC: 12 MMOL/L (ref 7–15)
AST SERPL W P-5'-P-CCNC: 32 U/L (ref 10–50)
ATRIAL RATE - MUSE: 110 BPM
BASOPHILS # BLD AUTO: 0 10E3/UL (ref 0–0.2)
BASOPHILS NFR BLD AUTO: 0 %
BILIRUB SERPL-MCNC: 1 MG/DL
BUN SERPL-MCNC: 19.7 MG/DL (ref 8–23)
CALCIUM SERPL-MCNC: 9.2 MG/DL (ref 8.8–10.2)
CHLORIDE SERPL-SCNC: 107 MMOL/L (ref 98–107)
CREAT SERPL-MCNC: 0.97 MG/DL (ref 0.67–1.17)
DEPRECATED HCO3 PLAS-SCNC: 20 MMOL/L (ref 22–29)
DIASTOLIC BLOOD PRESSURE - MUSE: NORMAL MMHG
EOSINOPHIL # BLD AUTO: 0.5 10E3/UL (ref 0–0.7)
EOSINOPHIL NFR BLD AUTO: 4 %
ERYTHROCYTE [DISTWIDTH] IN BLOOD BY AUTOMATED COUNT: 13.1 % (ref 10–15)
GFR SERPL CREATININE-BSD FRML MDRD: 80 ML/MIN/1.73M2
GLUCOSE BLDC GLUCOMTR-MCNC: 104 MG/DL (ref 70–99)
GLUCOSE BLDC GLUCOMTR-MCNC: 108 MG/DL (ref 70–99)
GLUCOSE BLDC GLUCOMTR-MCNC: 121 MG/DL (ref 70–99)
GLUCOSE BLDC GLUCOMTR-MCNC: 141 MG/DL (ref 70–99)
GLUCOSE BLDC GLUCOMTR-MCNC: 158 MG/DL (ref 70–99)
GLUCOSE BLDC GLUCOMTR-MCNC: 88 MG/DL (ref 70–99)
GLUCOSE SERPL-MCNC: 94 MG/DL (ref 70–99)
HCT VFR BLD AUTO: 39.3 % (ref 40–53)
HGB BLD-MCNC: 13.3 G/DL (ref 13.3–17.7)
IMM GRANULOCYTES # BLD: 0 10E3/UL
IMM GRANULOCYTES NFR BLD: 0 %
INR PPP: 1.42 (ref 0.85–1.15)
INTERPRETATION ECG - MUSE: NORMAL
LVEF ECHO: NORMAL
LYMPHOCYTES # BLD AUTO: 1.5 10E3/UL (ref 0.8–5.3)
LYMPHOCYTES NFR BLD AUTO: 12 %
MCH RBC QN AUTO: 31 PG (ref 26.5–33)
MCHC RBC AUTO-ENTMCNC: 33.8 G/DL (ref 31.5–36.5)
MCV RBC AUTO: 92 FL (ref 78–100)
MONOCYTES # BLD AUTO: 1.1 10E3/UL (ref 0–1.3)
MONOCYTES NFR BLD AUTO: 9 %
NEUTROPHILS # BLD AUTO: 8.8 10E3/UL (ref 1.6–8.3)
NEUTROPHILS NFR BLD AUTO: 75 %
NRBC # BLD AUTO: 0 10E3/UL
NRBC BLD AUTO-RTO: 0 /100
P AXIS - MUSE: 37 DEGREES
PLATELET # BLD AUTO: 137 10E3/UL (ref 150–450)
POTASSIUM SERPL-SCNC: 3.6 MMOL/L (ref 3.4–5.3)
PR INTERVAL - MUSE: 172 MS
PROT SERPL-MCNC: 6 G/DL (ref 6.4–8.3)
QRS DURATION - MUSE: 82 MS
QT - MUSE: 358 MS
QTC - MUSE: 475 MS
R AXIS - MUSE: -26 DEGREES
RBC # BLD AUTO: 4.29 10E6/UL (ref 4.4–5.9)
SODIUM SERPL-SCNC: 139 MMOL/L (ref 136–145)
SYSTOLIC BLOOD PRESSURE - MUSE: NORMAL MMHG
T AXIS - MUSE: 66 DEGREES
VENTRICULAR RATE- MUSE: 106 BPM
WBC # BLD AUTO: 11.8 10E3/UL (ref 4–11)

## 2022-12-26 PROCEDURE — 250N000013 HC RX MED GY IP 250 OP 250 PS 637: Performed by: STUDENT IN AN ORGANIZED HEALTH CARE EDUCATION/TRAINING PROGRAM

## 2022-12-26 PROCEDURE — 99221 1ST HOSP IP/OBS SF/LOW 40: CPT | Performed by: SURGERY

## 2022-12-26 PROCEDURE — 258N000003 HC RX IP 258 OP 636: Performed by: STUDENT IN AN ORGANIZED HEALTH CARE EDUCATION/TRAINING PROGRAM

## 2022-12-26 PROCEDURE — 120N000002 HC R&B MED SURG/OB UMMC

## 2022-12-26 PROCEDURE — 85610 PROTHROMBIN TIME: CPT

## 2022-12-26 PROCEDURE — 71045 X-RAY EXAM CHEST 1 VIEW: CPT

## 2022-12-26 PROCEDURE — 80053 COMPREHEN METABOLIC PANEL: CPT

## 2022-12-26 PROCEDURE — 85025 COMPLETE CBC W/AUTO DIFF WBC: CPT

## 2022-12-26 PROCEDURE — 250N000011 HC RX IP 250 OP 636: Performed by: STUDENT IN AN ORGANIZED HEALTH CARE EDUCATION/TRAINING PROGRAM

## 2022-12-26 PROCEDURE — 71045 X-RAY EXAM CHEST 1 VIEW: CPT | Mod: 26 | Performed by: RADIOLOGY

## 2022-12-26 PROCEDURE — 93306 TTE W/DOPPLER COMPLETE: CPT

## 2022-12-26 PROCEDURE — 93306 TTE W/DOPPLER COMPLETE: CPT | Mod: 26 | Performed by: STUDENT IN AN ORGANIZED HEALTH CARE EDUCATION/TRAINING PROGRAM

## 2022-12-26 PROCEDURE — 36415 COLL VENOUS BLD VENIPUNCTURE: CPT

## 2022-12-26 RX ADMIN — HYDROCHLOROTHIAZIDE 12.5 MG: 12.5 TABLET ORAL at 08:43

## 2022-12-26 RX ADMIN — ATORVASTATIN CALCIUM 40 MG: 40 TABLET, FILM COATED ORAL at 19:31

## 2022-12-26 RX ADMIN — METRONIDAZOLE 500 MG: 500 INJECTION, SOLUTION INTRAVENOUS at 02:28

## 2022-12-26 RX ADMIN — CEFTRIAXONE SODIUM 2 G: 2 INJECTION, POWDER, FOR SOLUTION INTRAMUSCULAR; INTRAVENOUS at 11:53

## 2022-12-26 RX ADMIN — SODIUM CHLORIDE, POTASSIUM CHLORIDE, SODIUM LACTATE AND CALCIUM CHLORIDE: 600; 310; 30; 20 INJECTION, SOLUTION INTRAVENOUS at 22:06

## 2022-12-26 RX ADMIN — SODIUM CHLORIDE, POTASSIUM CHLORIDE, SODIUM LACTATE AND CALCIUM CHLORIDE: 600; 310; 30; 20 INJECTION, SOLUTION INTRAVENOUS at 06:04

## 2022-12-26 RX ADMIN — METRONIDAZOLE 500 MG: 500 INJECTION, SOLUTION INTRAVENOUS at 10:57

## 2022-12-26 RX ADMIN — AMLODIPINE BESYLATE 10 MG: 10 TABLET ORAL at 08:42

## 2022-12-26 RX ADMIN — MONTELUKAST 10 MG: 10 TABLET, FILM COATED ORAL at 21:51

## 2022-12-26 RX ADMIN — LISINOPRIL 10 MG: 10 TABLET ORAL at 08:43

## 2022-12-26 RX ADMIN — Medication 2000 MCG: at 08:42

## 2022-12-26 ASSESSMENT — ACTIVITIES OF DAILY LIVING (ADL)
ADLS_ACUITY_SCORE: 21
ADLS_ACUITY_SCORE: 20

## 2022-12-26 NOTE — CONSULTS
Care Management Initial Consult    General Information  Assessment completed with:  Patient and spouse (Livier)     Communication Assessment  Patient's communication style: spoken language (English or Bilingual)    Hearing Difficulty or Deaf: no   Wear Glasses or Blind: yes    Cognitive  Cognitive/Neuro/Behavioral: WDL                      Living Environment:   People in home:   Pt and spouse  Current living Arrangements:     House   Able to return to prior arrangements:  Yes     Family/Social Support:  Care provided by: spouse/significant other  Provides care for:  No one              Description of Support System:    Supportive: spouse, 2 sons (Pradeep and Gary) and 4 grandsons     Current Resources:   Patient receiving home care services:  No     Community Resources:    Equipment currently used at home: none  Supplies currently used at home:      Employment/Financial:  Employment Status:     Retired     Financial Concerns:   No financial concerns, spouse stated that she is able to afford to buy food, pay for accommodations if needed      Lifestyle & Psychosocial Needs:  Social Determinants of Health     Tobacco Use: Not on file   Alcohol Use: Not on file   Financial Resource Strain: Not on file   Food Insecurity: Not on file   Transportation Needs: Not on file   Physical Activity: Not on file   Stress: Not on file   Social Connections: Not on file   Intimate Partner Violence: Not on file   Depression: Not on file   Housing Stability: Not on file       Functional Status:  Prior to admission patient needed assistance:    Pt independent with ADL's      Mental Health Status:    Livier mentioned that pt may have some anxiety but SW unsure if this has been formally diagnosed/treated      Chemical Dependency Status:      No concerns indicated    Values/Beliefs:  Spiritual, Cultural Beliefs, Mandaen Practices, Values that affect care:       None          Additional Information:  SW notified this AM by charge RN that pt  and spouse were airlifted here from [facility] ([place]). Pt and spouse are from [place], [place]. Spouse (Livier) has been staying in pt's room, she has been told that this is against visiting guidelines but approved for 2 nights. Met with Livier to offer support and discuss resource needs. Livier tearful, she would like to stay in pt's room if possible, she has financial resource to stay at a local hotel but has bad knees and doesn't walk well and worries about walking outside on ice, etc. She stated that she has been sleeping well and would be fine moving into private room and sleeping in recliner if needed. She reports that she has enough money for food and has extra clothes/toiletries. She stated that her son Pradeep who lives in [place] is able to provide transportation to she and patient home upon discharge. SW offered support and validated feelings.    SW spoke with charge RN who stated that she will speak with RN manager tomorrow.    SW to remain available as needed.     : [phone]  Grace Hickman, [facility] on 12/26/2022 at 4:17 PM

## 2022-12-26 NOTE — PLAN OF CARE
Goal Outcome Evaluation:    6674-1701: A&O x4, afebrile. Bradycardic in 50-60s, OVSS on RA. Abdominal pain 1/10, patient declined interventions. Denied nausea/bloating, did not try any more clear liquids overnight. BG checks q 4 hours, 108 and 88. LR infusing at 125 ml/hr. Wife present at bedside and involved/supportive with cares.

## 2022-12-26 NOTE — PLAN OF CARE
"7142-9939: BP (!) 148/83 (BP Location: Left arm)   Pulse 68   Temp 99.5  F (37.5  C) (Oral)   Resp 18   Ht 1.727 m (5' 8\")   Wt 86.4 kg (190 lb 8 oz)   SpO2 90%   BMI 28.97 kg/m    Pt with pancreatitis. Reported 3/10 pain in abdomen, managed with PRN tylenol. Experienced intermittent nausea and abdominal distension, but declined antiemetic. Only tolerated small sips of water, didn't want to try any other clear liquids. Continued with Rocephin & Flagyl.Continued with MIVF. Wife at bedside, very supportive with cares. Continue POC.   "

## 2022-12-26 NOTE — PROVIDER NOTIFICATION
#[id number]    C/o increased dyspnea with walking, feels like he can't take a good breath. Tried to take a deep breath and started coughing. sating 91% on RA, added 1 LPM O2 via nasal cannula.    Ordering chest xRay, stop fluids for now.

## 2022-12-26 NOTE — PROGRESS NOTES
GASTROENTEROLOGY PROGRESS NOTE    Date: 12/26/2022     Tay Montanez is a 77 year old male with a history of HTN, HLD, Asthma who was admitted 12/24/22 for acute interstitial pancreatitis.     # Acute interstitial pancreatitis     Criteria for diagnosis: abdominal pain suggestive of AP, lipase > 3 ULN, CT abdomen    Etiology:  Likely gallstones given dynamic changes of LFTs. No stones on U/S. TG and Calcium are normal.     Index CT abdomen: 12/24/22     BISAP on admission: 3    Organ Failure: JOCELYN, resolved now    Fluid collection: None     Intervention: None    Thrombosis: None    Nutrition: clear liquid          Recommendations  - Continue supportive care, tolerating clear liquid diet. Advance diet slowly as tolerated to low fat and low residue diet.    - Please assess the need of continuing antibiotics, especially if blood cultures are negative and no clinical evidence of infection.   - IV fluids, BUN and hematocrit are going down. Aim for HR <120, UOP: 0.5-1 ml/kg/hr, BUN < 25 (or - Hematocrit reduction by at least 35 %, BUN decrease by about 35%)  - Pain control with opioids as needed per primary team. Avoid constipation with scheduled bowel regimen.   - Closely monitor electrolytes and replete PRN.   - Consult general surgery team for cholecystectomy as inpatient.   - Pain control, anti-emetics per primary team.   - GI will sign off. Please call with questions or updates.       Gastroenterology follow up recommendations: Pending clinical course.      Thank you for involving us in this patient's care. Please do not hesitate to contact the GI service with any questions or concerns.      Pt care plan discussed with Dr. Cody, GI staff physician.    Ricky Perez MD  Gastroenterology Fellow  Division of Gastroenterology, Hepatology and Nutrition  [facility]    _______________________________________________________________    Subjective:  No abdominal pain, pain has resolved. Able to tolerate  "clear liquid diet, felt full shortly. No vomiting or nausea.     Objective:  Blood pressure (!) 154/63, pulse 91, temperature 98.4  F (36.9  C), temperature source Oral, resp. rate 18, height 1.727 m (5' 8\"), weight 87.7 kg (193 lb 6.4 oz), SpO2 91 %.    Gen: A&Ox3, NAD  HEENT: ncat, perrl, eomi, sclera anicteric  CV: RRR  Lungs: CTA b/l  Abd: +bs, soft, nd/nt  Skin: no jaundice, no stigmata of chronic liver disease  MS: appropriate muscle mass for age  Neuro: non focal       LABS:  BMP  Recent Labs   Lab 12/26/22  0943 12/26/22  0716 12/26/22  0617 12/26/22  0225 12/25/22  1415 12/25/22  0659   NA  --  139  --   --   --  142   POTASSIUM  --  3.6  --   --   --  3.4   CHLORIDE  --  107  --   --   --  108*   LUIS FERNANDO  --  9.2  --   --   --  9.3   CO2  --  20*  --   --   --  23   BUN  --  19.7  --   --   --  23.9*   CR  --  0.97  --   --   --  1.21*   * 94 88 108*   < > 93    < > = values in this interval not displayed.     CBC  Recent Labs   Lab 12/26/22  0716 12/25/22  0659   WBC 11.8* 14.0*   RBC 4.29* 4.35*   HGB 13.3 13.6   HCT 39.3* 40.4   MCV 92 93   MCH 31.0 31.3   MCHC 33.8 33.7   RDW 13.1 13.1   * 171     INR  Recent Labs   Lab 12/26/22  0716 12/25/22  0659   INR 1.42* 1.39*     LFTs  Recent Labs   Lab 12/26/22  0716 12/25/22  0659   ALKPHOS 101 117   AST 32 49   ALT 42 65*   BILITOTAL 1.0 1.3*   PROTTOTAL 6.0* 5.9*   ALBUMIN 3.2* 3.4*      PANCNo lab results found in last 7 days.    Imaging:    U/S abdomen 12/25/22   FINDINGS:   Fluid: Trace free fluid inferior to the liver.     Liver: The liver demonstrates normal echotexture, measuring 13.9 cm in  craniocaudal dimension. There is no focal mass.      Gallbladder: There is no wall thickening, pericholecystic fluid, or  evidence for cholelithiasis. Sonographic Nick sign was not able to  be evaluated secondary to pain medications.     Bile Ducts: Both the intra- and extrahepatic biliary system are of  normal caliber.  The common bile duct measures 3 " mm in diameter.     Pancreas: Not visualized.      Kidney: The right kidney measures 10.7 cm long. There is a  circumscribed anechoic parapelvic cyst measuring 3.3 x 2.9 cm. There  is no hydronephrosis or hydroureter, no shadowing renal calculi,  cystic lesion or mass.                                                                       IMPRESSION:   1.  No cholelithiasis demonstrated.  2.  Trace perihepatic ascites.  3.  No biliary ductal dilatation demonstrated  4.  Right renal parapelvic cyst.    CT Abdomen Pelvis with Contrast (Final result) Result time 12/24/22 08:40:53   Final result by Naresh Monroe MD (12/24/22 08:40:53)     Narrative:   For Patients: As a result of the 21st Century Cures Act, medical imaging   exams and procedure reports are released immediately into your electronic   medical record. You may view this report before your referring provider.   If you have questions, please contact your health care provider.    INDICATION: Acute abdominal pain, vomiting, elevated WBC, guarding on   palpation    Indication:    Acute abdominal pain. Vomiting. Leukocytosis. Guarding on physical exam.     Technique:    CT of the abdomen and pelvis. Coronal/sagittal reconstruction images. 75 cc   of Omnipaque 350 IV.     Comparison:    None.     Findings:    Lung bases:     Coronary artery calcifications. Calcifications of the aortic root. The   ascending thoracic aorta measures 4.6 cm in dimension. This is dilated.   There is bibasilar dependent atelectasis. There is no basilar pneumothorax.   There is no suspicious pulmonary nodule. 2 millimeter lingular nodule on   image 12, series 201, of doubtful significance.     Abdomen/pelvis:     The liver morphology is non cirrhotic. There is no perihepatic ascites. No   calcified gallstone is seen. Inflammatory changes are present surrounding   the pancreas. Pancreatic parenchymal calcifications are noted. Findings   suggest acute interstitial edematous pancreatitis.  No organized   peripancreatic fluid collection is seen. Extensive renal sinus cysts. Left   renal cortical cyst is also noted. No splenomegaly. The splenic vein, SMV,   and extrahepatic main portal vein are patent. No solid renal mass or   striated nephrogram. Urinary bladder is normal. There is no free air. The   prostate measures 4.2 x 5.3 cm in AP and transverse dimensions. There is no   evidence for a small bowel or colonic obstruction. There is no transition   point. No mucosal hyper enhancement involving the small bowel or colon. No   pneumoperitoneum. There is no inguinal or pelvic sidewall lymphadenopathy.   Degenerative calcific plaque in a normal caliber abdominal aorta.     The bone windows demonstrate no suspicious bone lesions. There is   degenerative disc disease present in the lower thoracic and lumbar spine.   Vertebral body heights are maintained on sagittal reconstruction images.     Impression:    1. Acute interstitial edematous pancreatitis.     2. No organized peripancreatic fluid collection is seen.     3. Right upper quadrant ultrasound is suggested. No common bile duct stone   is visualized by single phase CT.     4. There is no free air, bowel obstruction, or drainable fluid collection.     5. Bilateral renal sinus cysts.

## 2022-12-26 NOTE — CONSULTS
General Surgery Consult  2022    Tay Montanez  : 1945    Date of Service: 2022 10:55 AM    Assessment and Plan:  Tay Montanez is a 77 year old male w/ PMH including AS MR, HTN, Asthma, Remote Hx seizures, who presented  and admitted w/ acute pancreatitis, suspicious for Gallstone pancreatitis and [facility] Surgery consulted cholecystectomy.    Presently doing well w/ pain significantly improved. No evidence of cholelithiasis, no evidence acute cholecystitis, no bile duct dilation, and no evidence of significant biliary sludge. Given patient has already eaten today, in addition to his ongoing workup for valve replacement, will hold off on cholecystectomy until tomorrow. Will discuss medical optimization w/ medicine.     - Lap [facility] - Board and consent  - NPO at [facility], [facility], Pre-op antibiotics  - General Surgery to follow, please reach out if any further questions/concerns.    Seen and discussed with Chief resident who will discuss w/ Staff, Dr. Swartz    Please page if questions,    Keyur Sotomayor MD, MS  PGY-2, General Surgery      History of Present Illness:    Tay Montanez is a 77 year old male w/ PMH including AS MR, HTN, Asthma, Remote Hx seizures, who presented  w/ abdominal pain, nausea, vomiting for 1-2 days.     ED evaluation notable for pancreatic inflammation on CT imaging,  and diagnosed with acute pancreatitis. Given lack of EtOH history, suspicion for Biliary pancreatitis and general surgery was consulted for consideration of cholecystectomy. RUQ US obtained and demonstrates no cholelithiasis, acute cholecystitis, or any ductal dilation.    He's doing well now with pain much better controlled than previously. No ongoing F/C/S, CP, or SOB. No N/V/D, no dysuria, or other urinary changes. Able to walk up a flight of stairs. Currently undergoing workup w/ cardiology for valve replacement. Otherwise no other significant issues.     Past Medical History:  Aortic  "stenosis  Mitral Regurgitation  HTN  Asthma  [facility].       Past Surgical History  Appendectomy    Family History:  No Bleeding, bruising, or clotting disorders.     Social History:  Social History     Socioeconomic History     Marital status: Not on file     Spouse name: Not on file     Number of children: Not on file     Years of education: Not on file     Highest education level: Not on file   Occupational History     Not on file   Tobacco Use     Smoking status: Not on file     Smokeless tobacco: Not on file   Substance and Sexual Activity     Alcohol use: Not on file     Drug use: Not on file     Sexual activity: Not on file   Other Topics Concern     Not on file   Social History Narrative     Not on file     Social Determinants of Health     Financial Resource Strain: Not on file   Food Insecurity: Not on file   Transportation Needs: Not on file   Physical Activity: Not on file   Stress: Not on file   Social Connections: Not on file   Intimate Partner Violence: Not on file   Housing Stability: Not on file     No Smoking, Drinking, or recreational drug use.     Medications:  Norvasc 10mg  Atorvastatin 40 every day  hydrochlorothiazide 12.5 daily  Lisinopril 10mg qd      Allergies:  None to medications    Review of Symptoms:  A 10 point review of symptoms has been conducted and is negative except for that mentioned in the above HPI.    Physical Exam:    Blood pressure (!) 154/63, pulse 91, temperature 98.4  F (36.9  C), temperature source Oral, resp. rate 18, height 1.727 m (5' 8\"), weight 87.7 kg (193 lb 6.4 oz), SpO2 91 %.  General: Alert, resting comfortably in NAD/N  TA. Cooperative  HEENT: NC/AT, sclerae anicteric.Oral mucosa moist  Neck: Trachea midline  Pulm: NLB on RA, no tachypnea/dyspnea  CV: RRR by RP, non-cyanotic, no LE edema.  ABD: Soft, non-tender, mildly-distended, no guarding or rebound tenderness. No obvious organomegaly. Old healed scar on the RLQ.   Extrem: MA4E, no obvious " deformities  Neuro:  A/Ox3, NFD,   Skin: Warm and well perfused        Labs:  CBC RESULTS:   Recent Labs   Lab Test 12/26/22  0716   WBC 11.8*   RBC 4.29*   HGB 13.3   HCT 39.3*   MCV 92   MCH 31.0   MCHC 33.8   RDW 13.1   *     Last Basic Metabolic Panel:  Lab Results   Component Value Date     12/26/2022      Lab Results   Component Value Date    POTASSIUM 3.6 12/26/2022     Lab Results   Component Value Date    CHLORIDE 107 12/26/2022     Lab Results   Component Value Date    LUIS FERNANDO 9.2 12/26/2022     Lab Results   Component Value Date    CO2 20 12/26/2022     Lab Results   Component Value Date    BUN 19.7 12/26/2022     Lab Results   Component Value Date    CR 0.97 12/26/2022     Lab Results   Component Value Date     12/26/2022    GLC 94 12/26/2022       Imaging:  RU US 12/25    IMPRESSION:   1.  No cholelithiasis demonstrated.  2.  Trace perihepatic ascites.  3.  No biliary ductal dilatation demonstrated  4.  Right renal parapelvic cyst.

## 2022-12-26 NOTE — PLAN OF CARE
Goal Outcome Evaluation:[id number]  Developed shortness of breath this afternoon. NC 2L PLACED FOR LAVELLE IN MID 90'S. Alert and oriented x4. Awaiting chest x ray. Blood pressure and heart rate stable IV fluids on hold. Wife at bedside. Tolerating clear liquid diet, no emesis. Abd pain rated 1/10. Will continue with plan of care.       Plan of Care Reviewed With: patient, spouse    Overall Patient Progress: no changeOverall Patient Progress: no change

## 2022-12-26 NOTE — PHARMACY-ADMISSION MEDICATION HISTORY
"Admission medication history interview status for the 12/25/2022 admission is complete. See EPIC admission navigator for allergy information, pharmacy, prior to admission medications and immunization status.     Medication history interview sources:  Copied from Care Everywhere documentation  Medication history completed by: Sugey Mock, PharmD, BCOP    Medication Sig Taking?   amLODIPine (NORVASC) 10 MG tablet Take 10 mg by mouth daily Yes   aspirin (ASA) 81 MG EC tablet Take 1 tablet by mouth daily Yes   atorvastatin (LIPITOR) 40 MG tablet Take 40 mg by mouth At Bedtime Yes   benazepril (LOTENSIN) 10 MG tablet Take 10 mg by mouth daily Yes   Cyanocobalamin 2000 MCG TBCR Take 1 tablet by mouth daily as needed Yes   diphenhydrAMINE HCl, Sleep, 25 MG CAPS Take 1 tablet by mouth nightly as needed Yes   hydrochlorothiazide (HYDRODIURIL) 12.5 MG tablet Take 12.5 mg by mouth daily Yes   IBUPROFEN-DIPHENHYDRAMINE CIT PO Take 1 tablet by mouth nightly as needed Yes   montelukast (SINGULAIR) 10 MG tablet Take 10 mg by mouth At Bedtime Yes   Multiple Vitamin (MULTI VITAMIN MENS PO) Take 1 tablet by mouth daily as needed Yes   NONFORMULARY Homeopathic Remedy \"Ring\" for tinnitus Yes   NONFORMULARY Hui' Fiber Good Gummy Yes   olopatadine (PATANASE) 0.6 % nasal spray Spray 2 sprays in nostril 3 times daily as needed (Rhinorrhea) Yes     "

## 2022-12-26 NOTE — PROVIDER NOTIFICATION
Dr. Tam paged #[phone]:     Patient is having some nausea and emesis x1. Could you put in something for nausea PRN? Thanks!

## 2022-12-26 NOTE — PROGRESS NOTES
[facility]    Progress Note - Medicine Service, PATRICIA TEAM 1       Date of Admission:  12/25/2022    Assessment & Plan   Tay Montanez is a 77 year old male admitted on 12/25/2022. He has a history of HTN, HLD, Asthma, epilepsy s/p brain surgery 1992 who is admitted as a transfer from OSH for acute pancreatitis.     Major Changes and Updates today  - Consulted general surgery for inpt cholecystectomy;   - given pt became hypoxic with dyspea on exertion will order TTE and Cardiologu consult.   - Will defer surgery until TTE results are back and seen by cardiology for medical optimization.   - Continue mIVF and pain control  - Advance diet as tolerated     #Acute Interstitial Pancreatitis  New onset of abdominal pain and emesis x 6 NBNB on 12/24. He denied any history alcohol use or any recent changes in medication. He does not have a prior history of colicky pain with fatty foods. Without alcohol use, greatest concern is for gallstones. Unable to get RUQ US at OSH. CT A/P 12/24 at OSH showed   Acute interstitial edematous pancreatitis, with no evidence of cirrhosis, nor ascites. RUQ US 12/25 showed no cholelithiasis, trace perihepatic ascites and right renal parapelvic cyst.     - GI consulted 12/25 recommended fluid resuscitation, pain control and RUQ US to rule out cholelithiasis.  - s/p 1L LR for 4 hrs and mIVF 125 mL/hr  - Advance diet as tolerated   - Consulted general surgery for cholecystectomy   - Pain: APAP Q6 sheduled, oxycodone PRN, hydromorphone PRN (3rd line)  - On admission started on piperacillin/tazobactam; 12/25 switched to Ceftriaxone and metronidazole.     #Aortic Stenosis  #Mitral Regurgitation  Echo on 10/22 with severe mitral regurgitation, consistent with anterior leaflet pathology - Left cath without evidence of ischemia or severe arterial stenosis. Aortic valve with moderate stenosis  - Plan for surgery in 01/2023     #HTN  - Continue PTA  "hydrochlorothiazide  - Continue PTA benazipril  - Continue PTA amlodipine     #Asthma  - Continue home montelukast     #Epilepsy, Resolved  S/p brain surgery nearly 30 years prior, no longer on anti-epileptics.          Diet: Advance Diet as Tolerated: Clear Liquid Diet    DVT Prophylaxis: Pneumatic Compression Devices  Lamb Catheter: Not present  Fluids: mIVF 125 ml/hr   Central Lines: None  Cardiac Monitoring: None  Code Status: Full Code      Disposition Plan     Expected Discharge Date: 12/27/2022                The patient's care was discussed with the Attending Physician, Dr. Kingsley Ritter.    Lily Claytno MD  Medicine Service, [facility] TEAM [address] [place] [address]  Securely message with the Vocera Web Console (learn more here)  Text page via [facility] Paging/Directory   Please see signed in provider for up to date coverage information      Clinically Significant Risk Factors              # Hypoalbuminemia: Lowest albumin = 3.2 g/dL at 12/26/2022  7:16 AM, will monitor as appropriate           # Overweight: Estimated body mass index is 29.41 kg/m  as calculated from the following:    Height as of this encounter: 1.727 m (5' 8\").    Weight as of this encounter: 87.7 kg (193 lb 6.4 oz)., PRESENT ON ADMISSION         ______________________________________________________________________    Interval History   Nursing Notes reviewed. No acute events overnight. Yesterday trial of clear liquids but pt had 1 episodes of emesis however since then did not have any additional emesis. He denies any N/V today. He notes that his abd pain has improved and now describes it as more of a discomfort. He had no additional concerns today.     Data reviewed today: I reviewed all medications, new labs and imaging results over the last 24 hours. I personally reviewed no images or EKG's today.    Physical Exam   Vital Signs: Temp: 98.4  F (36.9  C) Temp src: Oral BP: (!) 154/63 Pulse: 91   Resp: 18 " SpO2: 91 % O2 Device: None (Room air)    Weight: 193 lbs 6.4 oz  General Appearance: Appears well and in no acute distress  HEENT: No scleral icterus, EOMI  Respiratory: CTA b/l   Cardiovascular: RRR, no murmur  GI: mild tenderness to palpation in the epigastric and LUQ area, soft, non distended, no rebound tenderness  Skin: No Rash  Neuro: Alert and oriented x 3     Data   Recent Labs   Lab 12/26/22  0943 12/26/22  0716 12/26/22  0617 12/25/22  1415 12/25/22  0659   WBC  --  11.8*  --   --  14.0*   HGB  --  13.3  --   --  13.6   MCV  --  92  --   --  93   PLT  --  137*  --   --  171   INR  --  1.42*  --   --  1.39*   NA  --  139  --   --  142   POTASSIUM  --  3.6  --   --  3.4   CHLORIDE  --  107  --   --  108*   CO2  --  20*  --   --  23   BUN  --  19.7  --   --  23.9*   CR  --  0.97  --   --  1.21*   ANIONGAP  --  12  --   --  11   LUIS FERNANDO  --  9.2  --   --  9.3   * 94 88   < > 93   ALBUMIN  --  3.2*  --   --  3.4*   PROTTOTAL  --  6.0*  --   --  5.9*   BILITOTAL  --  1.0  --   --  1.3*   ALKPHOS  --  101  --   --  117   ALT  --  42  --   --  65*   AST  --  32  --   --  49    < > = values in this interval not displayed.     No results found for this or any previous visit (from the past 24 hour(s)).

## 2022-12-27 LAB
ALBUMIN SERPL BCG-MCNC: 3 G/DL (ref 3.5–5.2)
ALP SERPL-CCNC: 93 U/L (ref 40–129)
ALT SERPL W P-5'-P-CCNC: 29 U/L (ref 10–50)
ANION GAP SERPL CALCULATED.3IONS-SCNC: 10 MMOL/L (ref 7–15)
AST SERPL W P-5'-P-CCNC: 26 U/L (ref 10–50)
BASOPHILS # BLD AUTO: 0 10E3/UL (ref 0–0.2)
BASOPHILS NFR BLD AUTO: 0 %
BILIRUB SERPL-MCNC: 0.6 MG/DL
BUN SERPL-MCNC: 12.6 MG/DL (ref 8–23)
CALCIUM SERPL-MCNC: 9 MG/DL (ref 8.8–10.2)
CHLORIDE SERPL-SCNC: 107 MMOL/L (ref 98–107)
CREAT SERPL-MCNC: 0.93 MG/DL (ref 0.67–1.17)
DEPRECATED HCO3 PLAS-SCNC: 19 MMOL/L (ref 22–29)
EOSINOPHIL # BLD AUTO: 0.4 10E3/UL (ref 0–0.7)
EOSINOPHIL NFR BLD AUTO: 4 %
ERYTHROCYTE [DISTWIDTH] IN BLOOD BY AUTOMATED COUNT: 12.7 % (ref 10–15)
GFR SERPL CREATININE-BSD FRML MDRD: 85 ML/MIN/1.73M2
GLUCOSE BLDC GLUCOMTR-MCNC: 118 MG/DL (ref 70–99)
GLUCOSE BLDC GLUCOMTR-MCNC: 124 MG/DL (ref 70–99)
GLUCOSE BLDC GLUCOMTR-MCNC: 126 MG/DL (ref 70–99)
GLUCOSE BLDC GLUCOMTR-MCNC: 132 MG/DL (ref 70–99)
GLUCOSE BLDC GLUCOMTR-MCNC: 135 MG/DL (ref 70–99)
GLUCOSE BLDC GLUCOMTR-MCNC: 146 MG/DL (ref 70–99)
GLUCOSE SERPL-MCNC: 127 MG/DL (ref 70–99)
HCT VFR BLD AUTO: 35.4 % (ref 40–53)
HGB BLD-MCNC: 12.2 G/DL (ref 13.3–17.7)
IMM GRANULOCYTES # BLD: 0.1 10E3/UL
IMM GRANULOCYTES NFR BLD: 1 %
INR PPP: 1.35 (ref 0.85–1.15)
LIPASE SERPL-CCNC: 382 U/L (ref 13–60)
LYMPHOCYTES # BLD AUTO: 1.1 10E3/UL (ref 0.8–5.3)
LYMPHOCYTES NFR BLD AUTO: 11 %
MCH RBC QN AUTO: 31.2 PG (ref 26.5–33)
MCHC RBC AUTO-ENTMCNC: 34.5 G/DL (ref 31.5–36.5)
MCV RBC AUTO: 91 FL (ref 78–100)
MONOCYTES # BLD AUTO: 1 10E3/UL (ref 0–1.3)
MONOCYTES NFR BLD AUTO: 10 %
NEUTROPHILS # BLD AUTO: 7.1 10E3/UL (ref 1.6–8.3)
NEUTROPHILS NFR BLD AUTO: 74 %
NRBC # BLD AUTO: 0 10E3/UL
NRBC BLD AUTO-RTO: 0 /100
PLATELET # BLD AUTO: 141 10E3/UL (ref 150–450)
POTASSIUM SERPL-SCNC: 3.3 MMOL/L (ref 3.4–5.3)
POTASSIUM SERPL-SCNC: 3.4 MMOL/L (ref 3.4–5.3)
POTASSIUM SERPL-SCNC: 3.7 MMOL/L (ref 3.4–5.3)
PROT SERPL-MCNC: 5.6 G/DL (ref 6.4–8.3)
RBC # BLD AUTO: 3.91 10E6/UL (ref 4.4–5.9)
SODIUM SERPL-SCNC: 136 MMOL/L (ref 136–145)
WBC # BLD AUTO: 9.7 10E3/UL (ref 4–11)

## 2022-12-27 PROCEDURE — 250N000013 HC RX MED GY IP 250 OP 250 PS 637

## 2022-12-27 PROCEDURE — 84132 ASSAY OF SERUM POTASSIUM: CPT

## 2022-12-27 PROCEDURE — 258N000003 HC RX IP 258 OP 636: Performed by: STUDENT IN AN ORGANIZED HEALTH CARE EDUCATION/TRAINING PROGRAM

## 2022-12-27 PROCEDURE — 36415 COLL VENOUS BLD VENIPUNCTURE: CPT

## 2022-12-27 PROCEDURE — 80053 COMPREHEN METABOLIC PANEL: CPT

## 2022-12-27 PROCEDURE — 250N000013 HC RX MED GY IP 250 OP 250 PS 637: Performed by: STUDENT IN AN ORGANIZED HEALTH CARE EDUCATION/TRAINING PROGRAM

## 2022-12-27 PROCEDURE — 83690 ASSAY OF LIPASE: CPT

## 2022-12-27 PROCEDURE — 85610 PROTHROMBIN TIME: CPT

## 2022-12-27 PROCEDURE — 85025 COMPLETE CBC W/AUTO DIFF WBC: CPT

## 2022-12-27 PROCEDURE — 99232 SBSQ HOSP IP/OBS MODERATE 35: CPT | Mod: GC | Performed by: STUDENT IN AN ORGANIZED HEALTH CARE EDUCATION/TRAINING PROGRAM

## 2022-12-27 PROCEDURE — 120N000002 HC R&B MED SURG/OB UMMC

## 2022-12-27 PROCEDURE — 99223 1ST HOSP IP/OBS HIGH 75: CPT | Mod: GC | Performed by: INTERNAL MEDICINE

## 2022-12-27 RX ORDER — POTASSIUM CHLORIDE 750 MG/1
40 TABLET, EXTENDED RELEASE ORAL ONCE
Status: COMPLETED | OUTPATIENT
Start: 2022-12-27 | End: 2022-12-27

## 2022-12-27 RX ADMIN — POTASSIUM CHLORIDE 40 MEQ: 750 TABLET, EXTENDED RELEASE ORAL at 14:08

## 2022-12-27 RX ADMIN — Medication 2000 MCG: at 07:51

## 2022-12-27 RX ADMIN — SODIUM CHLORIDE, POTASSIUM CHLORIDE, SODIUM LACTATE AND CALCIUM CHLORIDE: 600; 310; 30; 20 INJECTION, SOLUTION INTRAVENOUS at 04:49

## 2022-12-27 RX ADMIN — LISINOPRIL 10 MG: 10 TABLET ORAL at 07:51

## 2022-12-27 RX ADMIN — POTASSIUM CHLORIDE 40 MEQ: 750 TABLET, EXTENDED RELEASE ORAL at 07:52

## 2022-12-27 RX ADMIN — HYDROCHLOROTHIAZIDE 12.5 MG: 12.5 TABLET ORAL at 07:51

## 2022-12-27 RX ADMIN — MONTELUKAST 10 MG: 10 TABLET, FILM COATED ORAL at 21:37

## 2022-12-27 RX ADMIN — ATORVASTATIN CALCIUM 40 MG: 40 TABLET, FILM COATED ORAL at 20:54

## 2022-12-27 RX ADMIN — AMLODIPINE BESYLATE 10 MG: 10 TABLET ORAL at 07:52

## 2022-12-27 ASSESSMENT — ACTIVITIES OF DAILY LIVING (ADL)
ADLS_ACUITY_SCORE: 21

## 2022-12-27 NOTE — PLAN OF CARE
[id number]    A&Ox4. VSS on RA. Denies pain, nausea, and SOB. Experiences frequency with urination. Intermittently incontinent of stool per pt report. Transitioned from full liquid to regular diet, tolerating well. Pt still continuing to eat small, soft meals. Cardiology consult done today, plan to operate after pt has heart procedure at outside hospital. GI pancreaticobiliary consult ordered. K 3.3, replaced PO, recheck 3.4. Replaced PO again, recheck at 1855, results pending. Q4 BG checks continued. Up with stand by assist. Ambulated in hallways this shift. Continue with plan of care.

## 2022-12-27 NOTE — CONSULTS
Cardiology New Consult Note       Date of Service: 12/27/22    ASSESSMENT:  Tay Montanez is a 77 year old male with past medical history of HTN, GERD, CAD (nonobstructive), seizures with previous brain surgery, CKD stage II-III, and mixed valvular heart disease who was admitted on 12/15/2022 for new onset abdominal pain and vomiting, found to have acute interstitial pancreatitis.  He was treated with IV hydration and pain control and is currently tolerating orals without any abdominal pain.  An abdominal ultrasound was obtained to investigate the cause of pancreatitis, which showed no signs of acute cholecystitis, cholelithiasis or biliary duct dilatation. General surgery was consulted given concern for gallstone pancreatitis.  Given patient's history of valvular disease, cardiology was consulted for preop evaluation prior to cholecystectomy.     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  According to the current ACC/AHA preoperative guidelines, there is a class I recommendations for valvular intervention before elective noncardiac surgery for patients who meet standard indications for valvular intervention based on symptoms or severity of their valvular disease.  If patient's surgery were deemed an emergency procedure, then an argument could be made for proceeding with uncorrected significant valvular heart disease.  Patient appears clinically stable from a pancreatitis standpoint and would likely be better served postponing the cholecystectomy until his valvular disease is addressed.      RECOMMENDATIONS:  - Recommend postponing  cholecystectomy until patient undergoes valvular intervention, which is planned for January 2023 (exact date not yet known).  - Cardiology will sign off. Please do not hesitate to contact us if any other questions arise.     Discussed with attending, Dr. Bagley.     Thank you for consulting the cardiovascular services at the [facility]. Please do not hesitate to call us with any questions.     Camille Ortiz MD  Cardiology Fellow  [phone]    ----------------------------------------------------------------------------  REASON FOR CONSULT: Pre-operative evaluation prior to cholecystectomy    History of Present Illness   Tay Montanez is a 77 year old male with past medical history of HTN, GERD, CAD (nonobstructive), seizures with previous brain surgery, CKD stage II-III, and mixed valvular heart disease who was admitted on 12/15/2022 for new onset abdominal pain and vomiting, found to have acute interstitial pancreatitis.  He was treated with IV hydration and pain control and is currently tolerating orals without any abdominal pain.  An abdominal ultrasound was obtained to investigate the cause of pancreatitis, which showed no signs of acute cholecystitis, cholelithiasis or biliary duct dilatation. General surgery was consulted given concern for gallstone pancreatitis.  Given patient's history of valvular disease, cardiology was consulted for preop evaluation prior to cholecystectomy.    Regarding patient's valvular disease history, he has had a history of known mild to moderate mitral regurgitation (secondary to anterior mitral valve prolapse), moderate aortic stenosis and mild aortic insufficiency and has been followed by his local cardiologist with serial TTEs.  He had a TTE on 9/22/2022 that showed severe aortic stenosis with V-max of 4.4 m/s and a mean gradient of 42 mmHg across the aortic valve.  For comparison, his last TTE prior to that on 5/12/2022 showed a mean gradient of 34 mmHg  across the aortic valve with a V-max of 4.0 m/s.  He was seen in valve clinic on 10/7/2022, where he reported occasional shortness of breath but not consistently.  His activity tolerance was slightly lower than baseline, but he had attributed this to older age.  He had no anginal or heart failure symptoms at that time.  Work-up was initiated for possible aortic valve replacement; NORY on 10/25/2022 showed thickened mitral valve leaflets consistent with myxomatous degeneration, moderate anterior mitral valve leaflet prolapse, severe mitral valve regurgitation with a posteriorly directed jet, probable bicuspid aortic valve and moderate aortic stenosis (mean gradient 27 mmHg, V-max 3.58 m/s, YEYO 1.5 cm2).  He also underwent a coronary angiogram on 11/29/2022 which showed nonobstructive coronary artery disease.  Patient was seen in cardiovascular surgery clinic on 12/12/2022 with plan to proceed with AVR, MV repair vs MVR, and ascending thoracic aorta aneurysm repair (measuring up to 4.5 cm on 12/12/2022 CT chest). Not yet scheduled but tentative plan for January 2023 per patient.     On interview, patient states that his activity tolerance has been decreasing over the years, although he is still able to perform ADLs without significant dyspnea.  He denies chest pain, lightheadedness, loss of consciousness, palpitations, lower extremity edema, weight gain, orthopnea or paroxysmal nocturnal dyspnea.    Cardiac medications: Amlodipine 10 mg daily, aspirin 81 mg daily, atorvastatin 40 mg daily, benazepril 10 mg daily, hydrochlorothiazide 12.5 mg daily.    PAST MEDICAL HISTORY:  - HTN  - GERD  - CAD (nonobstructive)  - Seizures with previous brain surgery  - CKD stage II-III  - Mixed valvular heart disease    CURRENT MEDICATIONS:  Current Facility-Administered Medications   Medication     acetaminophen (TYLENOL) tablet 650 mg     amLODIPine (NORVASC) tablet 10 mg     atorvastatin (LIPITOR) tablet 40 mg     cyanocobalamin  (VITAMIN B-12) tablet 2,000 mcg     hydrochlorothiazide (HYDRODIURIL) tablet 12.5 mg     lidocaine (LMX4) cream     lidocaine 1 % 0.1-1 mL     lisinopril (ZESTRIL) tablet 10 mg     melatonin tablet 1 mg     montelukast (SINGULAIR) tablet 10 mg     naloxone (NARCAN) injection 0.2 mg    Or     naloxone (NARCAN) injection 0.4 mg    Or     naloxone (NARCAN) injection 0.2 mg    Or     naloxone (NARCAN) injection 0.4 mg     ondansetron (ZOFRAN ODT) ODT tab 4 mg     oxyCODONE (ROXICODONE) tablet 5 mg     polyethylene glycol (MIRALAX) Packet 17 g     sennosides (SENOKOT) tablet 8.6 mg     sodium chloride (PF) 0.9% PF flush 3 mL     sodium chloride (PF) 0.9% PF flush 3 mL     ALLERGIES   No known drug allergies     FAMILY HISTORY:  Non-contributory    SOCIAL HISTORY:  Used to work in the [employer] business but retired in 2021.  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  Lives with wife.  Has never smoked.  No significant alcohol use history.    Review of Systems:   10-point ROS reviewed, & found negative w/ exceptions noted in the HPI.    Physical Exam   Temp: 98.2  F (36.8  C) Temp src: Oral BP: 138/77 Pulse: 75   Resp: 16 SpO2: 92 % O2 Device: None (Room air) Oxygen Delivery: 1 LPM  Vital Signs with Ranges  Temp:  [97.3  F (36.3  C)-99.3  F (37.4  C)] 98.2  F (36.8  C)  Pulse:  [75-91] 75  Resp:  [16-18] 16  BP: (121-155)/(43-77) 138/77  SpO2:  [89 %-94 %] 92 %  191 lbs 9.6 oz    GEN: No acute distress   HEENT: EOMI, no icterus, moist mucous membranes   CV: RRR, normal s1/s2, no murmurs. JVP not elevated  CHEST: Normal work of breathing. Lungs CTAB, no wheezes or crackles.   ABD: Soft, non-tender to palpation   EXT: Warm and well-perfused, No LE edema  NEURO: Awake, alert, answering questions appropriately, motor grossly nonfocal  PSYCH: cooperative, affect appropriate    LABS:  Recent Labs   Lab 12/27/22  1203 12/27/22  0934 12/27/22  0707 12/27/22  0613 12/26/22  0943  22  0716 22  1415 22  0659   WBC  --   --   --  9.7  --  11.8*  --  14.0*   HGB  --   --   --  12.2*  --  13.3  --  13.6   MCV  --   --   --  91  --  92  --  93   PLT  --   --   --  141*  --  137*  --  171   INR  --   --   --  1.35*  --  1.42*  --  1.39*   NA  --   --   --  136  --  139  --  142   POTASSIUM  --   --   --  3.3*  --  3.6  --  3.4   CHLORIDE  --   --   --  107  --  107  --  108*   CO2  --   --   --  19*  --  20*  --  23   BUN  --   --   --  12.6  --  19.7  --  23.9*   CR  --   --   --  0.93  --  0.97  --  1.21*   ANIONGAP  --   --   --  10  --  12  --  11   LUIS FERNANDO  --   --   --  9.0  --  9.2  --  9.3   * 146* 135* 127*   < > 94   < > 93   ALBUMIN  --   --   --  3.0*  --  3.2*  --  3.4*   PROTTOTAL  --   --   --  5.6*  --  6.0*  --  5.9*   BILITOTAL  --   --   --  0.6  --  1.0  --  1.3*   ALKPHOS  --   --   --  93  --  101  --  117   ALT  --   --   --  29  --  42  --  65*   AST  --   --   --  26  --  32  --  49   LIPASE  --   --   --  382*  --   --   --   --     < > = values in this interval not displayed.      EKG: Sinus arrhythmia       Pertinent cardiac testin2022 TTE:   Interpretation Summary   Definity was used to enhance endocardial visualization.   Ejection Fraction = 60-65%.   No regional wall motion abnormalities noted.   Normal diastolic function for age.   The right ventricle is normal in size and function.   The mitral leaflets appear thickened, hooded, and/or redundant, consistent with myxomatous degeneration.   Prolapse of the anterior mitral leaflet.   The mitral regurgitant jet is posteriorly directed, which is consistent with anterior leaflet pathology.   There is mild to moderate mitral regurgitation.   Right ventricular systolic pressure is 39 mmHg.   There is mild pulmonary hypertension.   The aortic valve is moderately calcified.   Severe valvular aortic stenosis. Consider referral to valve clinic.   Vmax of the aortic valve is 4.4   Mean gradient  across the aortic valve is 42   Mild aortic regurgitation.   The ascending aorta measures 4.0 in the AP diameter.   There is no pericardial effusion.     10/25/2022 TTE:  Interpretation Summary  Global and regional left ventricular function is hyperkinetic with an EF >70%.  Global right ventricular function is normal. The right ventricle is normal  size.  There is moderate aortic stenosis. The AV is heavily calcified with restricted  leaflet opening. The Vmax is 3.9 m/s, the mean gradient is 36 mmHg, and the DI  is 0.43. The patient's hyperdynamic LVEF is contributing to the elevated  Vmax/mean gradient.  There is mild dilation at the level of the sinuses of Valsalva (4.0 cm,  indexed value 2.0 cm/m2) and ascending aorta (4.2 cm, indexed value 2.1  cm/m2).  IVC diameter <2.1 cm collapsing >50% with sniff suggests a normal RA pressure  of 3 mmHg.  There is no prior study for direct comparison.    10/25/2022 NORY:  Interpretation Summary   Total Face to Face time for moderate sedation was 12 minutes.   There is mild concentric left ventricular hypertrophy.   Ejection Fraction = 60-65%.   No regional wall motion abnormalities noted.   The mitral leaflets appear thickened, hooded, and/or redundant, consistent with myxomatous degeneration.   There is moderate anterior MV leaflet prolapse   There is severe mitral regurgitation.   The mitral regurgitant jet is posteriorly directed, which is consistent with anterior leaflet pathology.   Probable bicuspid aortic valve. Moderate aortic stenosis. (MG 27, Vmax 3.58, YEYO 1.5)  Mild to moderate aortic regurgitation.   There is no pericardial effusion.   Mildly dilated ascending aorta.   The ascending aorta measures 4.6cm in the AP diameter.    11/29/2022 Coronary angiogram:  1. The left main coronary artery arises from the left coronary cusp and divides into the left anterior descending and left circumflex. It is free of angiographic disease.  2. The left anterior descending  artery arises from the left main and gives off diagonal and septal perforators before wrapping around the LV apex. It has mild luminal irregularities.   3. The left circumflex arises from the left main and is a dominant artery. It has mild to moderate luminal irregularities. The most significant lesion is a 40-50% moderate length stenosis in the inferior branch of the OM1.   4. The right coronary artery was not injected to conserve contrast as it is known to be nondominant.   CONCLUSION:  1. Non obstructive CAD   2. 25mmHg mean gradient across the AV on pullback. This value may be affected by sedation and volume status.

## 2022-12-27 NOTE — PROGRESS NOTES
Surgery Progress Note  12/27/2022       Subjective:  - Gallstone pancreatitis. Pain resolved. Tolerating diet without nausea or emesis. Having bowel movements. Ambulating.      Objective:  Temp:  [97.3  F (36.3  C)-99.3  F (37.4  C)] 97.3  F (36.3  C)  Pulse:  [78-91] 78  Resp:  [16-18] 18  BP: (121-155)/(43-74) 121/43  SpO2:  [89 %-94 %] 94 %    I/O last 3 completed shifts:  In: 1000 [I.V.:1000]  Out: -       Gen: Awake, alert, NAD  Resp: NLB on RA  Abd: soft, nondistended, nontender  Ext: WWP, no edema     Labs:  Recent Labs   Lab 12/27/22  0613 12/26/22  0716 12/25/22  0659   WBC 9.7 11.8* 14.0*   HGB 12.2* 13.3 13.6   * 137* 171       Recent Labs   Lab 12/27/22  0707 12/27/22  0613 12/27/22  0557 12/26/22  0943 12/26/22  0716 12/25/22  1415 12/25/22  0659   NA  --  136  --   --  139  --  142   POTASSIUM  --  3.3*  --   --  3.6  --  3.4   CHLORIDE  --  107  --   --  107  --  108*   CO2  --  19*  --   --  20*  --  23   BUN  --  12.6  --   --  19.7  --  23.9*   CR  --  0.93  --   --  0.97  --  1.21*   * 127* 124*   < > 94   < > 93   LUIS FERNANDO  --  9.0  --   --  9.2  --  9.3   MAG  --   --   --   --   --   --  1.8    < > = values in this interval not displayed.       Imaging:  Reviewed     Assessment/Plan:   77 year old male w/ PMH including AS MR, HTN, Asthma, remote Hx seizures, who presented 12/25 and admitted w/ acute pancreatitis, suspicious for gallstone pancreatitis and EGS consulted for consideration of cholecystectomy.     - Pending surgical risk stratification given aortic stenosis and mitral regurg with plans for future surgical repair. Appreciate medicine team assistance in this. Will follow up cardiology consult.   - Plans for cholecystectomy pending this evaluation.     Seen, examined, and discussed with chief resident, who will discuss with staff    Lima Avalos MD  [facility] General Surgery PGY-2  12/27/2022    See [facility] for on-call pager information: [facility] Paging/Directory - Surgery General  /[facility]

## 2022-12-27 NOTE — PROVIDER NOTIFICATION
Hayder 1 intern:    7D 7521-2 K.S. Are we still okay to advance diet for patient? Pt was told he is going to have surgery tomorrow and there is no NPO order for midnight. thank. Denita [phone]

## 2022-12-27 NOTE — PLAN OF CARE
Goal Outcome Evaluation: [id number]      Plan of Care Reviewed With: patient    Overall Patient Progress: no change    Outcome Evaluation:     Respiratory: WDL 2l NC overnight, Denies SOB  Cardiac: WDL. Denies Chest pain.  Neuro: A&Ox4. Calls appropriately.   GI/: Voiding spontaneously, last BM 12/26 pt self reports   Diet: full liquid   Skin: scattered bruising   Lines/drains: 2 R  LR infusing in wrist IV  Pain: Denies  Labs:  and 124  Activity: inpendent in room   Plan:  Continue plan of care

## 2022-12-27 NOTE — PROVIDER NOTIFICATION
"Provider Notification     Lily Clayton (#[id number]) paged at [phone]:  \"Hi, pt's K this AM is 3.3. Can you order RN managed replacement protocol?    Thanks, Alma.\"  "

## 2022-12-27 NOTE — PROGRESS NOTES
"  /74 (BP Location: Left arm)   Pulse 91   Temp 98.4  F (36.9  C) (Oral)   Resp 18   Ht 1.727 m (5' 8\")   Wt 87.7 kg (193 lb 6.4 oz)   SpO2 93%   BMI 29.41 kg/m      Tay is a 78 yo pt admitted 12/25/22 for acute pancreatitis, hx of HTN, HLD, Asthma.    VS, denies pain, on RA now can be switched to nasal canula if desats, up ad zaid, A&O x4, Full liquid diet advanced as tolerated, BM this shift, Voiding spontaneously, echo, x-ray  Done, surgery consultation completed with no plan for surgery tomorrow, Labs reviewed, POC BG q4, continue with POC    "

## 2022-12-28 VITALS
SYSTOLIC BLOOD PRESSURE: 119 MMHG | RESPIRATION RATE: 16 BRPM | BODY MASS INDEX: 28.33 KG/M2 | HEART RATE: 88 BPM | OXYGEN SATURATION: 95 % | HEIGHT: 68 IN | TEMPERATURE: 98.5 F | WEIGHT: 186.9 LBS | DIASTOLIC BLOOD PRESSURE: 69 MMHG

## 2022-12-28 LAB
ALBUMIN SERPL BCG-MCNC: 3.1 G/DL (ref 3.5–5.2)
ALP SERPL-CCNC: 96 U/L (ref 40–129)
ALT SERPL W P-5'-P-CCNC: 28 U/L (ref 10–50)
ANION GAP SERPL CALCULATED.3IONS-SCNC: 12 MMOL/L (ref 7–15)
AST SERPL W P-5'-P-CCNC: 29 U/L (ref 10–50)
BASOPHILS # BLD AUTO: 0 10E3/UL (ref 0–0.2)
BASOPHILS NFR BLD AUTO: 0 %
BILIRUB SERPL-MCNC: 0.6 MG/DL
BUN SERPL-MCNC: 12.2 MG/DL (ref 8–23)
CALCIUM SERPL-MCNC: 9.1 MG/DL (ref 8.8–10.2)
CHLORIDE SERPL-SCNC: 110 MMOL/L (ref 98–107)
CREAT SERPL-MCNC: 0.95 MG/DL (ref 0.67–1.17)
DEPRECATED HCO3 PLAS-SCNC: 18 MMOL/L (ref 22–29)
EOSINOPHIL # BLD AUTO: 0.4 10E3/UL (ref 0–0.7)
EOSINOPHIL NFR BLD AUTO: 5 %
ERYTHROCYTE [DISTWIDTH] IN BLOOD BY AUTOMATED COUNT: 12.7 % (ref 10–15)
GFR SERPL CREATININE-BSD FRML MDRD: 82 ML/MIN/1.73M2
GLUCOSE SERPL-MCNC: 105 MG/DL (ref 70–99)
HCT VFR BLD AUTO: 36.9 % (ref 40–53)
HGB BLD-MCNC: 12.7 G/DL (ref 13.3–17.7)
IMM GRANULOCYTES # BLD: 0 10E3/UL
IMM GRANULOCYTES NFR BLD: 0 %
INR PPP: 1.23 (ref 0.85–1.15)
LYMPHOCYTES # BLD AUTO: 1.9 10E3/UL (ref 0.8–5.3)
LYMPHOCYTES NFR BLD AUTO: 22 %
MCH RBC QN AUTO: 30.9 PG (ref 26.5–33)
MCHC RBC AUTO-ENTMCNC: 34.4 G/DL (ref 31.5–36.5)
MCV RBC AUTO: 90 FL (ref 78–100)
MONOCYTES # BLD AUTO: 1.1 10E3/UL (ref 0–1.3)
MONOCYTES NFR BLD AUTO: 13 %
NEUTROPHILS # BLD AUTO: 5 10E3/UL (ref 1.6–8.3)
NEUTROPHILS NFR BLD AUTO: 60 %
NRBC # BLD AUTO: 0 10E3/UL
NRBC BLD AUTO-RTO: 0 /100
PLATELET # BLD AUTO: 154 10E3/UL (ref 150–450)
POTASSIUM SERPL-SCNC: 3.6 MMOL/L (ref 3.4–5.3)
PROT SERPL-MCNC: 5.9 G/DL (ref 6.4–8.3)
RBC # BLD AUTO: 4.11 10E6/UL (ref 4.4–5.9)
SODIUM SERPL-SCNC: 140 MMOL/L (ref 136–145)
WBC # BLD AUTO: 8.5 10E3/UL (ref 4–11)

## 2022-12-28 PROCEDURE — 36415 COLL VENOUS BLD VENIPUNCTURE: CPT

## 2022-12-28 PROCEDURE — 85025 COMPLETE CBC W/AUTO DIFF WBC: CPT

## 2022-12-28 PROCEDURE — 250N000013 HC RX MED GY IP 250 OP 250 PS 637: Performed by: STUDENT IN AN ORGANIZED HEALTH CARE EDUCATION/TRAINING PROGRAM

## 2022-12-28 PROCEDURE — 99232 SBSQ HOSP IP/OBS MODERATE 35: CPT | Performed by: DIETITIAN, REGISTERED

## 2022-12-28 PROCEDURE — 80053 COMPREHEN METABOLIC PANEL: CPT

## 2022-12-28 PROCEDURE — 99239 HOSP IP/OBS DSCHRG MGMT >30: CPT | Mod: GC | Performed by: STUDENT IN AN ORGANIZED HEALTH CARE EDUCATION/TRAINING PROGRAM

## 2022-12-28 PROCEDURE — 85610 PROTHROMBIN TIME: CPT

## 2022-12-28 RX ADMIN — AMLODIPINE BESYLATE 10 MG: 10 TABLET ORAL at 07:46

## 2022-12-28 RX ADMIN — LISINOPRIL 10 MG: 10 TABLET ORAL at 07:46

## 2022-12-28 RX ADMIN — Medication 2000 MCG: at 07:46

## 2022-12-28 RX ADMIN — HYDROCHLOROTHIAZIDE 12.5 MG: 12.5 TABLET ORAL at 07:46

## 2022-12-28 ASSESSMENT — ACTIVITIES OF DAILY LIVING (ADL)
ADLS_ACUITY_SCORE: 21

## 2022-12-28 NOTE — PROVIDER NOTIFICATION
"Provider Notification     Lily Clayton (#[id number]) paged at [phone]:  \"Hi, pt wants to discharge home this morning. He is asking to leave as soon as possible. If patient is ok to go home can you see him and put in discharge orders?    ThanksAlma.\"      Response: Patient not ready to go home from a medical standpoint. Still needs to be seen by GI for possible ERCP with sphincterotomy. Team will see patient soon.        Will continue to monitor.   "

## 2022-12-28 NOTE — PROGRESS NOTES
Resident:    Pt stated he is leaving doctor or no doctor, please come to bedside, GI in room right now.

## 2022-12-28 NOTE — DISCHARGE SUMMARY
[facility]  Discharge Summary - Medicine & Pediatrics       Date of Admission:  12/25/2022  Date of Discharge:  12/28/2022  2:29 PM  Discharging Provider: Dr. Madhavi Barksdale  Discharge Service: Medicine Service, PATRICIA TEAM 1    Discharge Diagnoses   #Acute Interstitial Pancreatitis  #Aortic Stenosis  #HTN  #Asthma      Follow-ups Needed After Discharge   Follow-up Appointments     Follow Up and recommended labs and tests      Follow up with your cardiology for valve replacement and see general   surgery in [place] for follow up to assess timing for gall bladder removal           Unresulted Labs Ordered in the Past 30 Days of this Admission     Date and Time Order Name Status Description    12/25/2022 10:09 AM Blood Culture Line, venous Preliminary     12/25/2022 10:09 AM Blood Culture Hand, Left Preliminary       These results will be followed up by Hospitalist Team    Discharge Disposition   Discharged to home  Condition at discharge: Stable    Hospital Course   Tay Montanez has a history of HTN, HLD, Asthma, epilepsy s/p brain surgery 1992 who is admitted as a transfer from OSH for acute pancreatitis. The following problems were addressed during his hospitalization:    #Acute Interstitial Pancreatitis  New onset of abdominal pain and emesis x 6 NBNB on 12/24. No alcohol use or food associated colicky pain. Seen at OSH with elevated lipase and imaging consistent with pancreatitis. Transferred to [facility] for RUQ US and GI consultation. RUQ US 12/25 showed no cholelithiasis, trace perihepatic ascites and right renal parapelvic cyst. General surgery consulted for cholecystectomy deferred due to sever aortic stenosis.  - General surgery follow up in [place] once TAVR completed     #Aortic Stenosis  Echo at [facility] on 12/26/2022 with moderate aortic stenosis.  - Plan for surgery in 01/2023     #HTN  - Continued PTA hydrochlorothiazide  - Continued PTA benazipril  - Continued PTA  amlodipine     #Asthma  - Continued home montelukast     #Epilepsy, Resolved  S/p brain surgery nearly 30 years prior, no longer on anti-epileptics.    Consultations This Hospital Stay   GI PANCREATICOBILIARY ADULT IP CONSULT  NURSING TO CONSULT FOR VASCULAR ACCESS CARE IP CONSULT  SURGERY GENERAL ADULT IP CONSULT  CARE MANAGEMENT / SOCIAL WORK IP CONSULT  CARDIOLOGY GENERAL ADULT IP CONSULT  GI PANCREATICOBILIARY ADULT IP CONSULT    Code Status   Full Code     The patient was discussed with Dr. Shamir Talbot MD  [facility] [address] [address]  Phone: [phone]  ______________________________________________________________________    Physical Exam   Vital Signs: Temp: 97.5  F (36.4  C) Temp src: Oral BP: (!) 140/60 Pulse: 66   Resp: 18 SpO2: 94 % O2 Device: None (Room air)    Weight: 186 lbs 14.4 oz  Constitutional: awake, alert, cooperative, no apparent distress, and appears stated age  Eyes: Lids and lashes normal, pupils equal, round and reactive to light, extra ocular muscles intact, sclera clear, conjunctiva normal  ENT: Normocephalic, without obvious abnormality, atraumatic, sinuses nontender on palpation, external ears without lesions, oral pharynx with moist mucous membranes, tonsils without erythema or exudates, gums normal and good dentition.  Respiratory: No increased work of breathing, good air exchange, clear to auscultation bilaterally, no crackles or wheezing  Cardiovascular: Aortic murmur  GI: No scars, normal bowel sounds, soft, non-distended, non-tender, no masses palpated, no hepatosplenomegally  Skin: no bruising or bleeding and normal skin color, texture, turgor  Neurologic: Awake, alert, oriented to name, place and time.  Cranial nerves II-XII are grossly intact.  Motor is 5 out of 5 bilaterally.  Cerebellar finger to nose, heel to shin intact.  Sensory is intact.  Babinski down going, Romberg negative, and  gait is normal.      Primary Care Physician   No primary care provider on file.    Discharge Orders      Reason for your hospital stay    Gallstone pancreatitis     Activity    Your activity upon discharge: activity as tolerated     Follow Up and recommended labs and tests    Follow up with your cardiology for valve replacement and see general surgery in [place] for follow up to assess timing for gall bladder removal     Diet    Follow this diet upon discharge: Orders Placed This Encounter      Regular Diet Adult       Significant Results and Procedures   Most Recent 3 CBC's:Recent Labs   Lab Test 12/28/22  0558 12/27/22  0613 12/26/22  0716   WBC 8.5 9.7 11.8*   HGB 12.7* 12.2* 13.3   MCV 90 91 92    141* 137*     Most Recent 3 BMP's:Recent Labs   Lab Test 12/28/22  0558 12/27/22  1910 12/27/22  1704 12/27/22  1308 12/27/22  1203 12/27/22  0707 12/27/22  0613 12/26/22  0943 12/26/22  0716     --   --   --   --   --  136  --  139   POTASSIUM 3.6 3.7  --  3.4  --   --  3.3*  --  3.6   CHLORIDE 110*  --   --   --   --   --  107  --  107   CO2 18*  --   --   --   --   --  19*  --  20*   BUN 12.2  --   --   --   --   --  12.6  --  19.7   CR 0.95  --   --   --   --   --  0.93  --  0.97   ANIONGAP 12  --   --   --   --   --  10  --  12   LUIS FERNANDO 9.1  --   --   --   --   --  9.0  --  9.2   *  --  126*  --  118*   < > 127*   < > 94    < > = values in this interval not displayed.     Most Recent 2 LFT's:Recent Labs   Lab Test 12/28/22  0558 12/27/22  0613   AST 29 26   ALT 28 29   ALKPHOS 96 93   BILITOTAL 0.6 0.6   ,   Results for orders placed or performed during the hospital encounter of 12/25/22   US Abdomen Limited    Narrative    EXAMINATION: Limited Abdominal Ultrasound, 12/25/2022 10:00 AM     COMPARISON: None.    HISTORY: Right upper quadrant pain with laboratory evidence of  pancreatitis. Concern for gallstones    FINDINGS:   Fluid: Trace free fluid inferior to the liver.    Liver: The liver demonstrates  normal echotexture, measuring 13.9 cm in  craniocaudal dimension. There is no focal mass.     Gallbladder: There is no wall thickening, pericholecystic fluid, or  evidence for cholelithiasis. Sonographic Nick sign was not able to  be evaluated secondary to pain medications.    Bile Ducts: Both the intra- and extrahepatic biliary system are of  normal caliber.  The common bile duct measures 3 mm in diameter.    Pancreas: Not visualized.     Kidney: The right kidney measures 10.7 cm long. There is a  circumscribed anechoic parapelvic cyst measuring 3.3 x 2.9 cm. There  is no hydronephrosis or hydroureter, no shadowing renal calculi,  cystic lesion or mass.       Impression    IMPRESSION:   1.  No cholelithiasis demonstrated.  2.  Trace perihepatic ascites.  3.  No biliary ductal dilatation demonstrated  4.  Right renal parapelvic cyst.    I have personally reviewed the examination and initial interpretation  and I agree with the findings.    LUIS SIMS MD         SYSTEM ID:  [id number]   XR Chest 2 Views    Narrative    XR CHEST 2 VIEWS  12/25/2022 10:56 AM     HISTORY:  r/o pneumonia in pt with fever       COMPARISON:  None    FINDINGS:     Frontal and lateral views of the chest. Trachea is midline. Cardiac  silhouette is within normal limits. Aortic arch atherosclerosis.  Bibasilar streaky opacities, left more than right, possible  small/trace left pleural effusion.. No appreciable pneumothorax.  Degenerative changes of the visualized spine.        Impression    IMPRESSION: Left-sided predominant basilar opacities with possible  trace pleural effusion, may represent infection and/or atelectasis.    I have personally reviewed the examination and initial interpretation  and I agree with the findings.    LUIS SIMS MD         SYSTEM ID:  [id number]   XR Chest Port 1 View    Narrative    Portable chest    INDICATION: Acute hypoxia    COMPARISON: Yesterday 1035 hours    FINDINGS: Heart size upper normal. Mild  elevation of right  hemidiaphragm. Streaky and other patchy opacities in the mid to lower  lungs appear slightly increased overall. There is calcification at the  aortic knob. Bony structures appeared grossly intact.      Impression    IMPRESSION: Increasing atelectasis rather than infiltrates or edema in  the lung bases. Recommend follow-up to clearing. Atherosclerosis.    RICK SINGH MD         SYSTEM ID:  [id number]   Echo Complete     Value    LVEF  70%    Narrative    [id number]  [id number]  [id number]  [id number]^ETELVINA^DENIA     [facility]  Echocardiography Laboratory  [address] [place] [id number]     Name: JIMMIE ARORA  MRN: [id number]  : 1945  Study Date: 2022 03:28 PM  Age: 77 yrs  Gender: Male  Patient Location: [facility]  Reason For Study: Dyspnea  Ordering Physician: DENIA MAIN  Referring Physician: FANG NAGY  Performed By: Urmila Whitehead     BSA: 2.0 m2  Height: 68 in  Weight: 193 lb  BP: 155/65 mmHg  ______________________________________________________________________________  Procedure  Complete Portable Echo Adult.  ______________________________________________________________________________  Interpretation Summary  Global and regional left ventricular function is hyperkinetic with an EF >70%.  Global right ventricular function is normal. The right ventricle is normal  size.  There is moderate aortic stenosis. The AV is heavily calcified with restricted  leaflet opening. The Vmax is 3.9 m/s, the mean gradient is 36 mmHg, and the DI  is 0.43. The patient's hyperdynamic LVEF is contributing to the elevated  Vmax/mean gradient.  There is mild dilation at the level of the sinuses of Valsalva (4.0 cm,  indexed value 2.0 cm/m2) and ascending aorta (4.2 cm, indexed value 2.1  cm/m2).  IVC diameter <2.1 cm collapsing >50% with sniff suggests a normal RA pressure  of 3 mmHg.  There is no prior study for direct  comparison.  ______________________________________________________________________________  Left Ventricle  Global and regional left ventricular function is hyperkinetic with an EF >70%.  Left ventricular size is normal. Thickening of the anterobasal septum is  present. Left ventricular diastolic function is normal.     Right Ventricle  The right ventricle is normal size. Global right ventricular function is  normal.     Atria  Both atria appear normal.     Mitral Valve  Mild mitral annular calcification is present. Trace mitral insufficiency is  present.     Aortic Valve  There is moderate aortic stenosis. The AV is heavily calcified with restricted  leaflet opening. The Vmax is 3.9 m/s, the mean gradient is 36 mmHg, and the DI  is 0.43. The patient's hyperdynamic LVEF is contributing to the elevated  Vmax/mean gradient.     Tricuspid Valve  The tricuspid valve is normal. Trace tricuspid insufficiency is present.  Pulmonary artery systolic pressure cannot be assessed.     Pulmonic Valve  The valve leaflets are not well visualized. Trace pulmonic insufficiency is  present.     Vessels  There is mild dilation at the level of the sinuses of Valsalva (4.0 cm,  indexed value 2.0 cm/m2) and ascending aorta (4.2 cm, indexed value 2.1  cm/m2). IVC diameter <2.1 cm collapsing >50% with sniff suggests a normal RA  pressure of 3 mmHg.     Pericardium  No pericardial effusion is present.     Compared to Previous Study  There is no prior study for direct comparison.  ______________________________________________________________________________  MMode/2D Measurements & Calculations     IVSd: 1.1 cm  LVIDd: 5.3 cm  LVIDs: 3.2 cm  LVPWd: 1.0 cm  FS: 39.2 %  LV mass(C)d: 208.5 grams  LV mass(C)dI: 103.6 grams/m2  Ao root diam: 4.0 cm  asc Aorta Diam: 4.2 cm  LVOT diam: 2.7 cm  LVOT area: 5.7 cm2  LA Volume (BP): 65.9 ml  LA Volume Index (BP): 32.8 ml/m2  RWT: 0.39     Doppler Measurements & Calculations  Ao V2 max: 392.5  "cm/sec  Ao max P.0 mmHg  Ao V2 mean: 279.4 cm/sec  Ao mean P.5 mmHg  Ao V2 VTI: 75.2 cm  YEYO(I,D): 2.3 cm2  YEYO(V,D): 2.5 cm2  LV V1 max P.7 mmHg  LV V1 max: 171.0 cm/sec  LV V1 VTI: 30.8 cm  SV(LVOT): 176.1 ml  SI(LVOT): 87.5 ml/m2  AV Isiah Ratio (DI): 0.44  YEYO Index (cm2/m2): 1.2     ______________________________________________________________________________  Report approved by: Eliseo Michaels 2022 04:48 PM               Discharge Medications   Current Discharge Medication List      CONTINUE these medications which have NOT CHANGED    Details   amLODIPine (NORVASC) 10 MG tablet Take 10 mg by mouth daily      aspirin (ASA) 81 MG EC tablet Take 1 tablet by mouth daily      atorvastatin (LIPITOR) 40 MG tablet Take 40 mg by mouth At Bedtime      benazepril (LOTENSIN) 10 MG tablet Take 10 mg by mouth daily      Cyanocobalamin 2000 MCG TBCR Take 1 tablet by mouth daily as needed      diphenhydrAMINE HCl, Sleep, 25 MG CAPS Take 1 tablet by mouth nightly as needed      hydrochlorothiazide (HYDRODIURIL) 12.5 MG tablet Take 12.5 mg by mouth daily      IBUPROFEN-DIPHENHYDRAMINE CIT PO Take 1 tablet by mouth nightly as needed Advil PM      montelukast (SINGULAIR) 10 MG tablet Take 10 mg by mouth At Bedtime      Multiple Vitamin (MULTI VITAMIN MENS PO) Take 1 tablet by mouth daily as needed      !! NONFORMULARY Homeopathic Remedy \"Ring\" for tinnitus      !! NONFORMULARY Hui' Fiber Good Gummy      olopatadine (PATANASE) 0.6 % nasal spray Spray 2 sprays in nostril 3 times daily as needed (Rhinorrhea)       !! - Potential duplicate medications found. Please discuss with provider.        Allergies   Not on File  "

## 2022-12-28 NOTE — PLAN OF CARE
[id number]    Goal Outcome Evaluation:    VSS on RA, denies pain nausea or shortness of breath. K recheck 3.7, no further replacements needed. Q4H blood sugars discontinued. GI consulted for an ERCP. Tolerating regular diet well. Continues to endorse urinary frequency. Voiding adequately, able to make needs known.

## 2022-12-28 NOTE — PROGRESS NOTES
Surgery Progress Note  12/28/2022       Subjective:  - Gallstone pancreatitis. Pain resolved. Tolerating diet without nausea or emesis. Having bowel movements. Ambulating.   - Cholecystectomy deferred until heart surgery is done     Objective:  Temp:  [97.3  F (36.3  C)-98.6  F (37  C)] 97.3  F (36.3  C)  Pulse:  [60-91] 74  Resp:  [16-20] 20  BP: (112-138)/(48-77) 112/48  SpO2:  [92 %-97 %] 95 %    I/O last 3 completed shifts:  In: 1120 [P.O.:1120]  Out: -       Gen: Awake, alert, NAD  Resp: NLB on RA  Abd: soft, nondistended, nontender  Ext: WWP, no edema     Labs:  Recent Labs   Lab 12/28/22  0558 12/27/22  0613 12/26/22  0716   WBC 8.5 9.7 11.8*   HGB 12.7* 12.2* 13.3    141* 137*       Recent Labs   Lab 12/28/22  0558 12/27/22  1910 12/27/22  1704 12/27/22  1308 12/27/22  1203 12/27/22  0707 12/27/22  0613 12/26/22  0943 12/26/22  0716 12/25/22  1415 12/25/22  0659     --   --   --   --   --  136  --  139  --  142   POTASSIUM 3.6 3.7  --  3.4  --   --  3.3*  --  3.6  --  3.4   CHLORIDE 110*  --   --   --   --   --  107  --  107  --  108*   CO2 18*  --   --   --   --   --  19*  --  20*  --  23   BUN 12.2  --   --   --   --   --  12.6  --  19.7  --  23.9*   CR 0.95  --   --   --   --   --  0.93  --  0.97  --  1.21*   *  --  126*  --  118*   < > 127*   < > 94   < > 93   LUIS FERNANDO 9.1  --   --   --   --   --  9.0  --  9.2  --  9.3   MAG  --   --   --   --   --   --   --   --   --   --  1.8    < > = values in this interval not displayed.       Imaging:  Reviewed     Assessment/Plan:   77 year old male w/ PMH including AS MR, HTN, Asthma, remote Hx seizures, who presented 12/25 and admitted w/ acute pancreatitis, suspicious for gallstone pancreatitis and EGS consulted for consideration of cholecystectomy.     - Cardiology recommend postponing cholecystectomy until patient undergoes valvular intervention, which is planned for January 2023  - Recommend to consult GI team for possible ERCP to decrease the  risk of recurrent gallstone pancreatitis till the surgery time.     - Plans for cholecystectomy after the cardiac surgery as an outpatient     Seen, examined, and discussed with chief resident, who will discuss with staff    Rubin Wright MD  Surgery resident, PGY-1  [facility]     See [facility] for on-call pager information: [facility] Paging/Directory - Surgery General /[facility]

## 2022-12-28 NOTE — CONSULTS
GASTROENTEROLOGY PROGRESS NOTE    Date of Admission: 12/25/2022  Reason for Admission: Pancreatitis    ASSESSMENT:  Tay Montanez is a 77 year old male with a history of HTN, HLD, Asthma who was admitted 12/24/22 for acute pancreatitis.    Re-consulted 12/28 on recommendation from surgery for consideration of ERCP/sphincterotomy given plans to defer cholecystectomy until post planned TAVR Jan 2023.    # Acute on chronic interstitial pancreatitis, suspect r/t gallstone vs alternative etiology    Criteria for diagnosis: abdominal pain suggestive of AP, lipase > 3 ULN, CT abdomen    Etiology:  Likely gallstones given dynamic changes of LFTs. TG and Calcium normal. Denies EtOH. First known episode of pancreatitis.    Index CT abdomen: 12/24/22     BISAP on admission: 3    Organ Failure: JOCELYN    Fluid collection: None     Intervention: None    Thrombosis: None    Nutrition: NPO    -Presented with acute onset of abd pain and emesis on 12/24.    -CT A/P with contrast at OSH on12/24 with evidence acute interstitial edematous pancreatitis, no organized peripancreatic fluid collection, no gallstones nor CBD stone. Pancreatic calcifications suggestive of chronic pancreatitis.  -Abd US 12/25: CBD 3 mm no intra/extrahepatic dilation, no e/o cholelithiasis nor cholecystitis.  -LFTs normal.  -Tolerating Regular diet.  -General surgery following, pending completion of TAVR before consider cholecystectomy.  -Desires to discharge home today.     RECOMMENDATIONS:  -No indication for ERCP/sphincterostomy given without evidence of stones on available imaging, LFTs normal and clinically improving pancreatitis.  -Discussed recs with patient and medicine team and agree to proceed with discharge.  Pt to follow up for cardiac procedures and general surgery for cholecystectomy.     The patient was discussed and plan agreed upon with GI staff, Dr Martínez.    GI Follow up (we will arrange): None unless pancreatitis episodes persist s/p  "nadeem.    Lois Harman PA-C  GI Service  [facility]  Text Page  _______________________________________________________________      Subjective: Nursing notes and 24hr events reviewed. Patient seen and examined at 10:30. Patient reports tolerating regular diet without further N/V, improved abd pain.  Confirmed this was first episode of known pancreatitis.    ROS:   4 pt ROS negative unless noted in subjective.     Objective:  Blood pressure 112/48, pulse 74, temperature 97.3  F (36.3  C), temperature source Oral, resp. rate 20, height 1.727 m (5' 8\"), weight 84.8 kg (186 lb 14.4 oz), SpO2 95 %.  Gen: Walking around in room. Appears comfortable and in NAD/non-toxic.  HEENT: NCAT. Conjunctiva clear. Sclera anicteric.  CV: RRR, Peripheral perfusion intact  Resp: non-labored work of breathing  Abd: Soft, NT, ND, no guarding or rebound  Msk: no gross deformity  Skin:  no jaundice  Ext: warm, well perfused   Neuro: grossly normal  Mental status/Psych: A&O. Asks/answers questions appropriately       PROCEDURES:  None.    LABS:  [facility]  Recent Labs   Lab 12/28/22  0558 12/27/22  1910 12/27/22  1704 12/27/22  1308 12/27/22  1203 12/27/22  0934 12/27/22  0707 12/27/22  0613 12/26/22  0943 12/26/22  0716 12/25/22  1415 12/25/22  0659     --   --   --   --   --   --  136  --  139  --  142   POTASSIUM 3.6 3.7  --  3.4  --   --   --  3.3*  --  3.6  --  3.4   CHLORIDE 110*  --   --   --   --   --   --  107  --  107  --  108*   LUIS FERNANDO 9.1  --   --   --   --   --   --  9.0  --  9.2  --  9.3   CO2 18*  --   --   --   --   --   --  19*  --  20*  --  23   BUN 12.2  --   --   --   --   --   --  12.6  --  19.7  --  23.9*   CR 0.95  --   --   --   --   --   --  0.93  --  0.97  --  1.21*   *  --  126*  --  118* 146*   < > 127*   < > 94   < > 93    < > = values in this interval not displayed.     CBC  Recent Labs   Lab 12/28/22  0558 12/27/22  0613 12/26/22  0716 12/25/22  0659   WBC 8.5 9.7 11.8* " 14.0*   RBC 4.11* 3.91* 4.29* 4.35*   HGB 12.7* 12.2* 13.3 13.6   HCT 36.9* 35.4* 39.3* 40.4   MCV 90 91 92 93   MCH 30.9 31.2 31.0 31.3   MCHC 34.4 34.5 33.8 33.7   RDW 12.7 12.7 13.1 13.1    141* 137* 171     INR  Recent Labs   Lab 12/28/22  0558 12/27/22  0613 12/26/22  0716 12/25/22  0659   INR 1.23* 1.35* 1.42* 1.39*     LFTs  Recent Labs   Lab 12/28/22  0558 12/27/22  0613 12/26/22  0716 12/25/22  0659   ALKPHOS 96 93 101 117   AST 29 26 32 49   ALT 28 29 42 65*   BILITOTAL 0.6 0.6 1.0 1.3*   PROTTOTAL 5.9* 5.6* 6.0* 5.9*   ALBUMIN 3.1* 3.0* 3.2* 3.4*      PANC  Recent Labs   Lab 12/27/22  0613   LIPASE 382*       IMAGING:  (Personally reviewed)    Limited Abdominal Ultrasound, 12/25/2022 10:00 AM   COMPARISON: None.  HISTORY: Right upper quadrant pain with laboratory evidence of  pancreatitis. Concern for gallstones     FINDINGS:   Fluid: Trace free fluid inferior to the liver.     Liver: The liver demonstrates normal echotexture, measuring 13.9 cm in  craniocaudal dimension. There is no focal mass.      Gallbladder: There is no wall thickening, pericholecystic fluid, or  evidence for cholelithiasis. Sonographic Nick sign was not able to  be evaluated secondary to pain medications.     Bile Ducts: Both the intra- and extrahepatic biliary system are of  normal caliber.  The common bile duct measures 3 mm in diameter.     Pancreas: Not visualized.      Kidney: The right kidney measures 10.7 cm long. There is a  circumscribed anechoic parapelvic cyst measuring 3.3 x 2.9 cm. There  is no hydronephrosis or hydroureter, no shadowing renal calculi,  cystic lesion or mass.                                                                     IMPRESSION:   1.  No cholelithiasis demonstrated.  2.  Trace perihepatic ascites.  3.  No biliary ductal dilatation demonstrated  4.  Right renal parapelvic cyst.      CT Abdomen Pelvis with Contrast (Final result) Result time 12/24/22 08:40:53   Final result by Naresh AGUILAR  MD Fer (12/24/22 08:40:53) - [facility]  Narrative:   For Patients: As a result of the 21st Century Cures Act, medical imaging   exams and procedure reports are released immediately into your electronic   medical record. You may view this report before your referring provider.   If you have questions, please contact your health care provider.    INDICATION: Acute abdominal pain, vomiting, elevated WBC, guarding on   palpation    Indication:Acute abdominal pain. Vomiting. Leukocytosis. Guarding on physical exam.     Technique:  CT of the abdomen and pelvis. Coronal/sagittal reconstruction images. 75 cc   of Omnipaque 350 IV.     Comparison:None.     Findings:  Lung bases:   Coronary artery calcifications. Calcifications of the aortic root. The   ascending thoracic aorta measures 4.6 cm in dimension. This is dilated.   There is bibasilar dependent atelectasis. There is no basilar pneumothorax.   There is no suspicious pulmonary nodule. 2 millimeter lingular nodule on   image 12, series 201, of doubtful significance.     Abdomen/pelvis:   The liver morphology is non cirrhotic. There is no perihepatic ascites. No   calcified gallstone is seen. Inflammatory changes are present surrounding   the pancreas. Pancreatic parenchymal calcifications are noted. Findings   suggest acute interstitial edematous pancreatitis. No organized   peripancreatic fluid collection is seen. Extensive renal sinus cysts. Left   renal cortical cyst is also noted. No splenomegaly. The splenic vein, SMV,   and extrahepatic main portal vein are patent. No solid renal mass or   striated nephrogram. Urinary bladder is normal. There is no free air. The   prostate measures 4.2 x 5.3 cm in AP and transverse dimensions. There is no   evidence for a small bowel or colonic obstruction. There is no transition   point. No mucosal hyper enhancement involving the small bowel or colon. No   pneumoperitoneum. There is no inguinal or pelvic sidewall lymphadenopathy.    Degenerative calcific plaque in a normal caliber abdominal aorta.     The bone windows demonstrate no suspicious bone lesions. There is   degenerative disc disease present in the lower thoracic and lumbar spine.   Vertebral body heights are maintained on sagittal reconstruction images.     Impression:  1. Acute interstitial edematous pancreatitis.   2. No organized peripancreatic fluid collection is seen.   3. Right upper quadrant ultrasound is suggested. No common bile duct stone   is visualized by single phase CT.   4. There is no free air, bowel obstruction, or drainable fluid collection.   5. Bilateral renal sinus cysts.

## 2022-12-28 NOTE — PROGRESS NOTES
[facility]    Progress Note - Medicine Service, MAROON TEAM 1       Date of Admission:  12/25/2022    Assessment & Plan   Tay Montanez is a 77 year old male admitted on 12/25/2022. He has a history of HTN, HLD, Asthma, epilepsy s/p brain surgery 1992 who is admitted as a transfer from OSH for acute pancreatitis.     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   - Cardiology consulted and given aortic stenosis recommend deferring non urgent surgery until valvular intervention as planned in Jan 2023.   - GI consulted for ERCP with possible sphincterotomy per surgery since considering differing lap nadeem at this time.    - Regular diet.     #Acute Interstitial Pancreatitis  New onset of abdominal pain and emesis x 6 NBNB on 12/24. He denied any history alcohol use or any recent changes in medication. He does not have a prior history of colicky pain with fatty foods. Without alcohol use, greatest concern is for gallstones. Unable to get RUQ US at OSH. CT A/P 12/24 at OSH showed   Acute interstitial edematous pancreatitis, with no evidence of cirrhosis, nor ascites. RUQ US 12/25 showed no cholelithiasis, trace perihepatic ascites and right renal parapelvic cyst. GI consulted 12/25 recommended fluid resuscitation, pain control and RUQ US to rule out cholelithiasis. On admission started on piperacillin/tazobactam; 12/25 switched to Ceftriaxone and metronidazole discontinued 12/26s/p 1L LR for 4 hrs and mIVF 125 mL/hr on admission. General surgery consulted for cholecystectomy however deferred due to moderate aortic stenosis. Cardiology consulted 12/26 for further evaluation and recommend postponing cholecystectomy until patient undergoes valvular intervention, which is planned for January 2023 (exact date not yet known).  -  Regular diet.   -  Pain: APAP Q6 sheduled, oxycodone PRN    Hypokalemia    K 3.3 12/27   -  "repleting  - Monitor with daily labs     #Aortic Stenosis  #Mitral Regurgitation  Echo on 10/22 with severe mitral regurgitation, consistent with anterior leaflet pathology - Left cath without evidence of ischemia or severe arterial stenosis. Aortic valve with moderate stenosis  - Plan for surgery in 01/2023     #HTN  - Continue PTA hydrochlorothiazide  - Continue PTA benazipril  - Continue PTA amlodipine     #Asthma  - Continue home montelukast     #Epilepsy, Resolved  S/p brain surgery nearly 30 years prior, no longer on anti-epileptics.          Diet: Regular Diet Adult    DVT Prophylaxis: Pneumatic Compression Devices  Lamb Catheter: Not present  Fluids: mIVF 125 ml/hr   Central Lines: None  Cardiac Monitoring: None  Code Status: Full Code      Disposition Plan      Expected Discharge Date: 12/28/2022                The patient's care was discussed with the Attending Physician Dr Madhavi Clayton MD  Medicine Service, [address]  Securely message with the Vocera Web Console (learn more here)  Text page via [facility] Paging/Directory   Please see signed in provider for up to date coverage information      Clinically Significant Risk Factors        # Hypokalemia: Lowest K = 3.3 mmol/L in last 2 days, will replace as needed       # Hypoalbuminemia: Lowest albumin = 3 g/dL at 12/27/2022  6:13 AM, will monitor as appropriate           # Overweight: Estimated body mass index is 29.13 kg/m  as calculated from the following:    Height as of this encounter: 1.727 m (5' 8\").    Weight as of this encounter: 86.9 kg (191 lb 9.6 oz)., PRESENT ON ADMISSION         ______________________________________________________________________    Interval History   Nursing Notes reviewed. No acute events overnight. He denies any N/V and states that abdominal pain has improved today. He denied any chest pain, palpitations. He had no additional concerns today. "     Data reviewed today: I reviewed all medications, new labs and imaging results over the last 24 hours. I personally reviewed no images or EKG's today.    Physical Exam   Vital Signs: Temp: 98.6  F (37  C) Temp src: Oral BP: 135/59 Pulse: 91   Resp: 18 SpO2: 97 % O2 Device: None (Room air)    Weight: 191 lbs 9.6 oz  General Appearance: Appears well and in no acute distress  HEENT: No scleral icterus, EOMI  Respiratory: CTA b/l   Cardiovascular: RRR, no murmur  GI: soft, non-tender, non distended, no rebound tenderness  Skin: No Rash  Neuro: Alert and oriented x 3     Data   Recent Labs   Lab 12/27/22  1910 12/27/22  1704 12/27/22  1308 12/27/22  1203 12/27/22  0934 12/27/22  0707 12/27/22  0613 12/26/22  0943 12/26/22  0716 12/25/22  1415 12/25/22  0659   WBC  --   --   --   --   --   --  9.7  --  11.8*  --  14.0*   HGB  --   --   --   --   --   --  12.2*  --  13.3  --  13.6   MCV  --   --   --   --   --   --  91  --  92  --  93   PLT  --   --   --   --   --   --  141*  --  137*  --  171   INR  --   --   --   --   --   --  1.35*  --  1.42*  --  1.39*   NA  --   --   --   --   --   --  136  --  139  --  142   POTASSIUM 3.7  --  3.4  --   --   --  3.3*  --  3.6  --  3.4   CHLORIDE  --   --   --   --   --   --  107  --  107  --  108*   CO2  --   --   --   --   --   --  19*  --  20*  --  23   BUN  --   --   --   --   --   --  12.6  --  19.7  --  23.9*   CR  --   --   --   --   --   --  0.93  --  0.97  --  1.21*   ANIONGAP  --   --   --   --   --   --  10  --  12  --  11   LUIS FERNANDO  --   --   --   --   --   --  9.0  --  9.2  --  9.3   GLC  --  126*  --  118* 146*   < > 127*   < > 94   < > 93   ALBUMIN  --   --   --   --   --   --  3.0*  --  3.2*  --  3.4*   PROTTOTAL  --   --   --   --   --   --  5.6*  --  6.0*  --  5.9*   BILITOTAL  --   --   --   --   --   --  0.6  --  1.0  --  1.3*   ALKPHOS  --   --   --   --   --   --  93  --  101  --  117   ALT  --   --   --   --   --   --  29  --  42  --  65*   AST  --   --   --   --    --   --  26  --  32  --  49   LIPASE  --   --   --   --   --   --  382*  --   --   --   --     < > = values in this interval not displayed.     No results found for this or any previous visit (from the past 24 hour(s)).

## 2022-12-30 LAB
BACTERIA BLD CULT: NO GROWTH
BACTERIA BLD CULT: NO GROWTH